# Patient Record
Sex: FEMALE | Race: BLACK OR AFRICAN AMERICAN | NOT HISPANIC OR LATINO | Employment: UNEMPLOYED | URBAN - METROPOLITAN AREA
[De-identification: names, ages, dates, MRNs, and addresses within clinical notes are randomized per-mention and may not be internally consistent; named-entity substitution may affect disease eponyms.]

---

## 2023-12-19 ENCOUNTER — HOSPITAL ENCOUNTER (EMERGENCY)
Facility: HOSPITAL | Age: 53
Discharge: HOME | End: 2023-12-19
Attending: EMERGENCY MEDICINE
Payer: MEDICARE

## 2023-12-19 VITALS
RESPIRATION RATE: 19 BRPM | OXYGEN SATURATION: 98 % | SYSTOLIC BLOOD PRESSURE: 159 MMHG | HEART RATE: 90 BPM | HEIGHT: 66 IN | TEMPERATURE: 98.8 F | DIASTOLIC BLOOD PRESSURE: 99 MMHG | BODY MASS INDEX: 35.43 KG/M2 | WEIGHT: 220.46 LBS

## 2023-12-19 DIAGNOSIS — N39.0 LOWER URINARY TRACT INFECTION: ICD-10-CM

## 2023-12-19 DIAGNOSIS — F25.0 SCHIZOAFFECTIVE DISORDER, BIPOLAR TYPE (MULTI): Primary | ICD-10-CM

## 2023-12-19 LAB
ALBUMIN SERPL BCP-MCNC: 4.2 G/DL (ref 3.4–5)
ALP SERPL-CCNC: 71 U/L (ref 33–110)
ALT SERPL W P-5'-P-CCNC: 17 U/L (ref 7–45)
AMPHETAMINES UR QL SCN: NORMAL
ANION GAP SERPL CALC-SCNC: 15 MMOL/L (ref 10–20)
APAP SERPL-MCNC: <10 UG/ML
APPEARANCE UR: CLEAR
AST SERPL W P-5'-P-CCNC: 18 U/L (ref 9–39)
BARBITURATES UR QL SCN: NORMAL
BASOPHILS # BLD AUTO: 0.03 X10*3/UL (ref 0–0.1)
BASOPHILS NFR BLD AUTO: 0.5 %
BENZODIAZ UR QL SCN: NORMAL
BILIRUB SERPL-MCNC: 0.4 MG/DL (ref 0–1.2)
BILIRUB UR STRIP.AUTO-MCNC: NEGATIVE MG/DL
BUN SERPL-MCNC: 8 MG/DL (ref 6–23)
BZE UR QL SCN: NORMAL
CALCIUM SERPL-MCNC: 9.9 MG/DL (ref 8.6–10.6)
CANNABINOIDS UR QL SCN: NORMAL
CHLORIDE SERPL-SCNC: 105 MMOL/L (ref 98–107)
CO2 SERPL-SCNC: 24 MMOL/L (ref 21–32)
COLOR UR: YELLOW
CREAT SERPL-MCNC: 0.88 MG/DL (ref 0.5–1.05)
EOSINOPHIL # BLD AUTO: 0.22 X10*3/UL (ref 0–0.7)
EOSINOPHIL NFR BLD AUTO: 3.4 %
ERYTHROCYTE [DISTWIDTH] IN BLOOD BY AUTOMATED COUNT: 12.1 % (ref 11.5–14.5)
ETHANOL SERPL-MCNC: <10 MG/DL
FENTANYL+NORFENTANYL UR QL SCN: NORMAL
GFR SERPL CREATININE-BSD FRML MDRD: 78 ML/MIN/1.73M*2
GLUCOSE SERPL-MCNC: 172 MG/DL (ref 74–99)
GLUCOSE UR STRIP.AUTO-MCNC: NEGATIVE MG/DL
HCT VFR BLD AUTO: 37.9 % (ref 36–46)
HGB BLD-MCNC: 13.5 G/DL (ref 12–16)
HOLD SPECIMEN: NORMAL
IMM GRANULOCYTES # BLD AUTO: 0.02 X10*3/UL (ref 0–0.7)
IMM GRANULOCYTES NFR BLD AUTO: 0.3 % (ref 0–0.9)
KETONES UR STRIP.AUTO-MCNC: NEGATIVE MG/DL
LEUKOCYTE ESTERASE UR QL STRIP.AUTO: ABNORMAL
LYMPHOCYTES # BLD AUTO: 2.28 X10*3/UL (ref 1.2–4.8)
LYMPHOCYTES NFR BLD AUTO: 35.6 %
MCH RBC QN AUTO: 30.2 PG (ref 26–34)
MCHC RBC AUTO-ENTMCNC: 35.6 G/DL (ref 32–36)
MCV RBC AUTO: 85 FL (ref 80–100)
MONOCYTES # BLD AUTO: 0.37 X10*3/UL (ref 0.1–1)
MONOCYTES NFR BLD AUTO: 5.8 %
MUCOUS THREADS #/AREA URNS AUTO: ABNORMAL /LPF
NEUTROPHILS # BLD AUTO: 3.48 X10*3/UL (ref 1.2–7.7)
NEUTROPHILS NFR BLD AUTO: 54.4 %
NITRITE UR QL STRIP.AUTO: NEGATIVE
NRBC BLD-RTO: 0 /100 WBCS (ref 0–0)
OPIATES UR QL SCN: NORMAL
OXYCODONE+OXYMORPHONE UR QL SCN: NORMAL
PCP UR QL SCN: NORMAL
PH UR STRIP.AUTO: 7 [PH]
PLATELET # BLD AUTO: 306 X10*3/UL (ref 150–450)
POTASSIUM SERPL-SCNC: 3.4 MMOL/L (ref 3.5–5.3)
PREGNANCY TEST URINE, POC: NEGATIVE
PROT SERPL-MCNC: 7.5 G/DL (ref 6.4–8.2)
PROT UR STRIP.AUTO-MCNC: ABNORMAL MG/DL
RBC # BLD AUTO: 4.47 X10*6/UL (ref 4–5.2)
RBC # UR STRIP.AUTO: ABNORMAL /UL
RBC #/AREA URNS AUTO: >20 /HPF
SALICYLATES SERPL-MCNC: <3 MG/DL
SARS-COV-2 RNA RESP QL NAA+PROBE: NOT DETECTED
SODIUM SERPL-SCNC: 141 MMOL/L (ref 136–145)
SP GR UR STRIP.AUTO: 1.01
SQUAMOUS #/AREA URNS AUTO: ABNORMAL /HPF
UROBILINOGEN UR STRIP.AUTO-MCNC: <2 MG/DL
WBC # BLD AUTO: 6.4 X10*3/UL (ref 4.4–11.3)
WBC #/AREA URNS AUTO: ABNORMAL /HPF

## 2023-12-19 PROCEDURE — 80179 DRUG ASSAY SALICYLATE: CPT

## 2023-12-19 PROCEDURE — 87635 SARS-COV-2 COVID-19 AMP PRB: CPT

## 2023-12-19 PROCEDURE — 81001 URINALYSIS AUTO W/SCOPE: CPT

## 2023-12-19 PROCEDURE — 36415 COLL VENOUS BLD VENIPUNCTURE: CPT

## 2023-12-19 PROCEDURE — 99285 EMERGENCY DEPT VISIT HI MDM: CPT | Performed by: EMERGENCY MEDICINE

## 2023-12-19 PROCEDURE — 85025 COMPLETE CBC W/AUTO DIFF WBC: CPT

## 2023-12-19 PROCEDURE — 81025 URINE PREGNANCY TEST: CPT

## 2023-12-19 PROCEDURE — 80307 DRUG TEST PRSMV CHEM ANLYZR: CPT

## 2023-12-19 PROCEDURE — 99284 EMERGENCY DEPT VISIT MOD MDM: CPT

## 2023-12-19 PROCEDURE — 2500000004 HC RX 250 GENERAL PHARMACY W/ HCPCS (ALT 636 FOR OP/ED)

## 2023-12-19 PROCEDURE — 80053 COMPREHEN METABOLIC PANEL: CPT

## 2023-12-19 RX ORDER — SULFAMETHOXAZOLE AND TRIMETHOPRIM 800; 160 MG/1; MG/1
1 TABLET ORAL ONCE
Status: COMPLETED | OUTPATIENT
Start: 2023-12-19 | End: 2023-12-19

## 2023-12-19 RX ORDER — SULFAMETHOXAZOLE AND TRIMETHOPRIM 800; 160 MG/1; MG/1
1 TABLET ORAL 2 TIMES DAILY
Qty: 10 TABLET | Refills: 0 | Status: SHIPPED | OUTPATIENT
Start: 2023-12-19 | End: 2023-12-24

## 2023-12-19 RX ADMIN — SULFAMETHOXAZOLE AND TRIMETHOPRIM 1 TABLET: 800; 160 TABLET ORAL at 13:44

## 2023-12-19 SDOH — HEALTH STABILITY: MENTAL HEALTH: ARE YOU HAVING THOUGHTS OF KILLING YOURSELF RIGHT NOW?: NO

## 2023-12-19 SDOH — HEALTH STABILITY: MENTAL HEALTH: DEPRESSION SYMPTOMS: NO PROBLEMS REPORTED OR OBSERVED.

## 2023-12-19 SDOH — HEALTH STABILITY: MENTAL HEALTH: WISH TO BE DEAD (PAST 1 MONTH): NO

## 2023-12-19 SDOH — HEALTH STABILITY: MENTAL HEALTH: IN THE PAST WEEK, HAVE YOU BEEN HAVING THOUGHTS ABOUT KILLING YOURSELF?: NO

## 2023-12-19 SDOH — HEALTH STABILITY: MENTAL HEALTH: NON-SPECIFIC ACTIVE SUICIDAL THOUGHTS (PAST 1 MONTH): NO

## 2023-12-19 SDOH — HEALTH STABILITY: MENTAL HEALTH: HAVE YOU EVER TRIED TO KILL YOURSELF?: NO

## 2023-12-19 SDOH — HEALTH STABILITY: MENTAL HEALTH: IN THE PAST FEW WEEKS, HAVE YOU WISHED YOU WERE DEAD?: NO

## 2023-12-19 SDOH — HEALTH STABILITY: MENTAL HEALTH: SUICIDAL BEHAVIOR (LIFETIME): NO

## 2023-12-19 SDOH — HEALTH STABILITY: MENTAL HEALTH: ANXIETY SYMPTOMS: GENERALIZED

## 2023-12-19 SDOH — ECONOMIC STABILITY: HOUSING INSECURITY: FEELS SAFE LIVING IN HOME: YES

## 2023-12-19 SDOH — HEALTH STABILITY: MENTAL HEALTH: IN THE PAST FEW WEEKS, HAVE YOU FELT THAT YOU OR YOUR FAMILY WOULD BE BETTER OFF IF YOU WERE DEAD?: NO

## 2023-12-19 ASSESSMENT — PAIN SCALES - GENERAL: PAINLEVEL_OUTOF10: 0 - NO PAIN

## 2023-12-19 ASSESSMENT — LIFESTYLE VARIABLES
HAVE YOU EVER FELT YOU SHOULD CUT DOWN ON YOUR DRINKING: NO
REASON UNABLE TO ASSESS: NO
EVER HAD A DRINK FIRST THING IN THE MORNING TO STEADY YOUR NERVES TO GET RID OF A HANGOVER: NO
PRESCIPTION_ABUSE_PAST_12_MONTHS: NO
HAVE PEOPLE ANNOYED YOU BY CRITICIZING YOUR DRINKING: NO
SUBSTANCE_ABUSE_PAST_12_MONTHS: NO
EVER FELT BAD OR GUILTY ABOUT YOUR DRINKING: NO

## 2023-12-19 ASSESSMENT — PAIN - FUNCTIONAL ASSESSMENT: PAIN_FUNCTIONAL_ASSESSMENT: 0-10
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
## 2023-12-19 NOTE — DISCHARGE INSTRUCTIONS
We would recommend starting a 5-day course of Bactrim for possible urinary tract infection while you are [facility].  Psychiatry otherwise does not recommend an inpatient stay other than continuing your treatment at [facility].

## 2023-12-24 ENCOUNTER — HOSPITAL ENCOUNTER (EMERGENCY)
Facility: HOSPITAL | Age: 53
Discharge: OTHER INSTITUTION | End: 2023-12-25
Attending: EMERGENCY MEDICINE
Payer: MEDICARE

## 2023-12-24 DIAGNOSIS — F25.9 SCHIZOAFFECTIVE DISORDER, UNSPECIFIED TYPE (MULTI): Primary | ICD-10-CM

## 2023-12-24 LAB
ALBUMIN SERPL BCP-MCNC: 3.9 G/DL (ref 3.4–5)
ALP SERPL-CCNC: 65 U/L (ref 33–110)
ALT SERPL W P-5'-P-CCNC: 17 U/L (ref 7–45)
AMPHETAMINES UR QL SCN: NORMAL
ANION GAP SERPL CALC-SCNC: 12 MMOL/L (ref 10–20)
APAP SERPL-MCNC: <10 UG/ML
AST SERPL W P-5'-P-CCNC: 16 U/L (ref 9–39)
BARBITURATES UR QL SCN: NORMAL
BASOPHILS # BLD AUTO: 0.03 X10*3/UL (ref 0–0.1)
BASOPHILS NFR BLD AUTO: 0.5 %
BENZODIAZ UR QL SCN: NORMAL
BILIRUB SERPL-MCNC: 0.3 MG/DL (ref 0–1.2)
BUN SERPL-MCNC: 14 MG/DL (ref 6–23)
BZE UR QL SCN: NORMAL
CALCIUM SERPL-MCNC: 10.1 MG/DL (ref 8.6–10.6)
CANNABINOIDS UR QL SCN: NORMAL
CHLORIDE SERPL-SCNC: 108 MMOL/L (ref 98–107)
CO2 SERPL-SCNC: 23 MMOL/L (ref 21–32)
CREAT SERPL-MCNC: 1.12 MG/DL (ref 0.5–1.05)
EOSINOPHIL # BLD AUTO: 0.33 X10*3/UL (ref 0–0.7)
EOSINOPHIL NFR BLD AUTO: 6 %
ERYTHROCYTE [DISTWIDTH] IN BLOOD BY AUTOMATED COUNT: 12.4 % (ref 11.5–14.5)
ETHANOL SERPL-MCNC: <10 MG/DL
FENTANYL+NORFENTANYL UR QL SCN: NORMAL
GFR SERPL CREATININE-BSD FRML MDRD: 59 ML/MIN/1.73M*2
GLUCOSE SERPL-MCNC: 119 MG/DL (ref 74–99)
HCT VFR BLD AUTO: 37.1 % (ref 36–46)
HGB BLD-MCNC: 12.7 G/DL (ref 12–16)
IMM GRANULOCYTES # BLD AUTO: 0.02 X10*3/UL (ref 0–0.7)
IMM GRANULOCYTES NFR BLD AUTO: 0.4 % (ref 0–0.9)
LYMPHOCYTES # BLD AUTO: 1.89 X10*3/UL (ref 1.2–4.8)
LYMPHOCYTES NFR BLD AUTO: 34.4 %
MCH RBC QN AUTO: 29.4 PG (ref 26–34)
MCHC RBC AUTO-ENTMCNC: 34.2 G/DL (ref 32–36)
MCV RBC AUTO: 86 FL (ref 80–100)
MONOCYTES # BLD AUTO: 0.43 X10*3/UL (ref 0.1–1)
MONOCYTES NFR BLD AUTO: 7.8 %
NEUTROPHILS # BLD AUTO: 2.8 X10*3/UL (ref 1.2–7.7)
NEUTROPHILS NFR BLD AUTO: 50.9 %
NRBC BLD-RTO: 0 /100 WBCS (ref 0–0)
OPIATES UR QL SCN: NORMAL
OXYCODONE+OXYMORPHONE UR QL SCN: NORMAL
PCP UR QL SCN: NORMAL
PLATELET # BLD AUTO: 270 X10*3/UL (ref 150–450)
POTASSIUM SERPL-SCNC: 4.3 MMOL/L (ref 3.5–5.3)
PREGNANCY TEST URINE, POC: NEGATIVE
PROT SERPL-MCNC: 6.7 G/DL (ref 6.4–8.2)
RBC # BLD AUTO: 4.32 X10*6/UL (ref 4–5.2)
SALICYLATES SERPL-MCNC: <3 MG/DL
SODIUM SERPL-SCNC: 139 MMOL/L (ref 136–145)
WBC # BLD AUTO: 5.5 X10*3/UL (ref 4.4–11.3)

## 2023-12-24 PROCEDURE — 80307 DRUG TEST PRSMV CHEM ANLYZR: CPT | Performed by: STUDENT IN AN ORGANIZED HEALTH CARE EDUCATION/TRAINING PROGRAM

## 2023-12-24 PROCEDURE — 2500000004 HC RX 250 GENERAL PHARMACY W/ HCPCS (ALT 636 FOR OP/ED): Mod: SE | Performed by: EMERGENCY MEDICINE

## 2023-12-24 PROCEDURE — 80143 DRUG ASSAY ACETAMINOPHEN: CPT | Performed by: STUDENT IN AN ORGANIZED HEALTH CARE EDUCATION/TRAINING PROGRAM

## 2023-12-24 PROCEDURE — 99285 EMERGENCY DEPT VISIT HI MDM: CPT | Mod: 25 | Performed by: EMERGENCY MEDICINE

## 2023-12-24 PROCEDURE — 85025 COMPLETE CBC W/AUTO DIFF WBC: CPT | Performed by: STUDENT IN AN ORGANIZED HEALTH CARE EDUCATION/TRAINING PROGRAM

## 2023-12-24 PROCEDURE — 81025 URINE PREGNANCY TEST: CPT | Performed by: STUDENT IN AN ORGANIZED HEALTH CARE EDUCATION/TRAINING PROGRAM

## 2023-12-24 PROCEDURE — 80053 COMPREHEN METABOLIC PANEL: CPT | Performed by: STUDENT IN AN ORGANIZED HEALTH CARE EDUCATION/TRAINING PROGRAM

## 2023-12-24 PROCEDURE — 99285 EMERGENCY DEPT VISIT HI MDM: CPT | Performed by: EMERGENCY MEDICINE

## 2023-12-24 PROCEDURE — 36415 COLL VENOUS BLD VENIPUNCTURE: CPT | Performed by: STUDENT IN AN ORGANIZED HEALTH CARE EDUCATION/TRAINING PROGRAM

## 2023-12-24 RX ORDER — OLANZAPINE 10 MG/2ML
INJECTION, POWDER, FOR SOLUTION INTRAMUSCULAR
Status: DISPENSED
Start: 2023-12-24 | End: 2023-12-25

## 2023-12-24 RX ORDER — HALOPERIDOL 5 MG/ML
INJECTION INTRAMUSCULAR
Status: DISPENSED
Start: 2023-12-24 | End: 2023-12-25

## 2023-12-24 RX ORDER — OLANZAPINE 10 MG/1
TABLET, ORALLY DISINTEGRATING ORAL
Status: COMPLETED
Start: 2023-12-24 | End: 2023-12-24

## 2023-12-24 RX ADMIN — OLANZAPINE: 10 TABLET, ORALLY DISINTEGRATING ORAL at 21:55

## 2023-12-24 SDOH — HEALTH STABILITY: MENTAL HEALTH

## 2023-12-24 SDOH — HEALTH STABILITY: MENTAL HEALTH: BEHAVIORS/MOOD: AGITATED;RESTLESS;HYPER-VERBAL;INAPPROPRIATE;IRRITABLE

## 2023-12-24 SDOH — HEALTH STABILITY: MENTAL HEALTH: NEEDS EXPRESSED: DENIES

## 2023-12-24 SDOH — HEALTH STABILITY: MENTAL HEALTH: HAVE YOU EVER DONE ANYTHING, STARTED TO DO ANYTHING, OR PREPARED TO DO ANYTHING TO END YOUR LIFE?: NO

## 2023-12-24 SDOH — HEALTH STABILITY: MENTAL HEALTH: HAVE YOU WISHED YOU WERE DEAD OR WISHED YOU COULD GO TO SLEEP AND NOT WAKE UP?: NO

## 2023-12-24 SDOH — HEALTH STABILITY: MENTAL HEALTH: CONTENT: BLAMING OTHERS

## 2023-12-24 SDOH — HEALTH STABILITY: MENTAL HEALTH: DELUSIONS: PERSECUTORY

## 2023-12-24 SDOH — HEALTH STABILITY: MENTAL HEALTH: SUICIDE ASSESSMENT: ADULT (C-SSRS)

## 2023-12-24 SDOH — HEALTH STABILITY: MENTAL HEALTH: HAVE YOU ACTUALLY HAD ANY THOUGHTS OF KILLING YOURSELF?: NO

## 2023-12-24 ASSESSMENT — COLUMBIA-SUICIDE SEVERITY RATING SCALE - C-SSRS
2. HAVE YOU ACTUALLY HAD ANY THOUGHTS OF KILLING YOURSELF?: NO
6. HAVE YOU EVER DONE ANYTHING, STARTED TO DO ANYTHING, OR PREPARED TO DO ANYTHING TO END YOUR LIFE?: NO
1. IN THE PAST MONTH, HAVE YOU WISHED YOU WERE DEAD OR WISHED YOU COULD GO TO SLEEP AND NOT WAKE UP?: NO

## 2023-12-25 VITALS
SYSTOLIC BLOOD PRESSURE: 153 MMHG | RESPIRATION RATE: 16 BRPM | WEIGHT: 220 LBS | BODY MASS INDEX: 38.98 KG/M2 | OXYGEN SATURATION: 100 % | HEIGHT: 63 IN | TEMPERATURE: 96.8 F | HEART RATE: 98 BPM | DIASTOLIC BLOOD PRESSURE: 89 MMHG

## 2023-12-25 LAB
ATRIAL RATE: 65 BPM
P AXIS: 47 DEGREES
P OFFSET: 210 MS
P ONSET: 163 MS
PR INTERVAL: 124 MS
Q ONSET: 225 MS
QRS COUNT: 11 BEATS
QRS DURATION: 80 MS
QT INTERVAL: 386 MS
QTC CALCULATION(BAZETT): 401 MS
QTC FREDERICIA: 396 MS
R AXIS: 14 DEGREES
SARS-COV-2 RNA RESP QL NAA+PROBE: NOT DETECTED
T AXIS: 4 DEGREES
T OFFSET: 418 MS
VENTRICULAR RATE: 65 BPM

## 2023-12-25 PROCEDURE — 96372 THER/PROPH/DIAG INJ SC/IM: CPT

## 2023-12-25 PROCEDURE — 2500000004 HC RX 250 GENERAL PHARMACY W/ HCPCS (ALT 636 FOR OP/ED): Mod: SE | Performed by: STUDENT IN AN ORGANIZED HEALTH CARE EDUCATION/TRAINING PROGRAM

## 2023-12-25 PROCEDURE — 2500000004 HC RX 250 GENERAL PHARMACY W/ HCPCS (ALT 636 FOR OP/ED): Mod: SE

## 2023-12-25 PROCEDURE — 87635 SARS-COV-2 COVID-19 AMP PRB: CPT | Performed by: STUDENT IN AN ORGANIZED HEALTH CARE EDUCATION/TRAINING PROGRAM

## 2023-12-25 PROCEDURE — 2500000004 HC RX 250 GENERAL PHARMACY W/ HCPCS (ALT 636 FOR OP/ED): Mod: SE | Performed by: EMERGENCY MEDICINE

## 2023-12-25 RX ORDER — LORAZEPAM 2 MG/ML
INJECTION INTRAMUSCULAR
Status: COMPLETED
Start: 2023-12-25 | End: 2023-12-25

## 2023-12-25 RX ORDER — DROPERIDOL 2.5 MG/ML
5 INJECTION, SOLUTION INTRAMUSCULAR; INTRAVENOUS ONCE
Status: COMPLETED | OUTPATIENT
Start: 2023-12-25 | End: 2023-12-25

## 2023-12-25 RX ORDER — DROPERIDOL 2.5 MG/ML
5 INJECTION, SOLUTION INTRAMUSCULAR; INTRAVENOUS ONCE
Status: DISCONTINUED | OUTPATIENT
Start: 2023-12-25 | End: 2023-12-25

## 2023-12-25 RX ORDER — LORAZEPAM 2 MG/ML
2 INJECTION INTRAMUSCULAR ONCE
Status: COMPLETED | OUTPATIENT
Start: 2023-12-25 | End: 2023-12-25

## 2023-12-25 RX ORDER — OLANZAPINE 10 MG/1
10 TABLET, ORALLY DISINTEGRATING ORAL ONCE
Status: COMPLETED | OUTPATIENT
Start: 2023-12-25 | End: 2023-12-25

## 2023-12-25 RX ADMIN — LORAZEPAM 2 MG: 2 INJECTION INTRAMUSCULAR at 12:45

## 2023-12-25 RX ADMIN — LORAZEPAM 2 MG: 2 INJECTION, SOLUTION INTRAMUSCULAR; INTRAVENOUS at 12:45

## 2023-12-25 RX ADMIN — OLANZAPINE 10 MG: 10 TABLET, ORALLY DISINTEGRATING ORAL at 10:59

## 2023-12-25 RX ADMIN — DROPERIDOL 5 MG: 2.5 INJECTION, SOLUTION INTRAMUSCULAR; INTRAVENOUS at 02:53

## 2023-12-25 SDOH — HEALTH STABILITY: MENTAL HEALTH: SUICIDAL BEHAVIOR (3 MONTHS): NO

## 2023-12-25 SDOH — HEALTH STABILITY: MENTAL HEALTH: IN THE PAST FEW WEEKS, HAVE YOU WISHED YOU WERE DEAD?: NO

## 2023-12-25 SDOH — HEALTH STABILITY: MENTAL HEALTH: IN THE PAST WEEK, HAVE YOU BEEN HAVING THOUGHTS ABOUT KILLING YOURSELF?: NO

## 2023-12-25 SDOH — HEALTH STABILITY: MENTAL HEALTH: ANXIETY SYMPTOMS: NO PROBLEMS REPORTED OR OBSERVED.

## 2023-12-25 SDOH — HEALTH STABILITY: MENTAL HEALTH: SUICIDAL BEHAVIOR (LIFETIME): YES

## 2023-12-25 SDOH — HEALTH STABILITY: MENTAL HEALTH: NON-SPECIFIC ACTIVE SUICIDAL THOUGHTS (PAST 1 MONTH): NO

## 2023-12-25 SDOH — ECONOMIC STABILITY: HOUSING INSECURITY: FEELS SAFE LIVING IN HOME: NO

## 2023-12-25 SDOH — HEALTH STABILITY: MENTAL HEALTH: HAVE YOU EVER TRIED TO KILL YOURSELF?: YES

## 2023-12-25 SDOH — HEALTH STABILITY: MENTAL HEALTH: IN THE PAST FEW WEEKS, HAVE YOU FELT THAT YOU OR YOUR FAMILY WOULD BE BETTER OFF IF YOU WERE DEAD?: NO

## 2023-12-25 SDOH — HEALTH STABILITY: MENTAL HEALTH: ARE YOU HAVING THOUGHTS OF KILLING YOURSELF RIGHT NOW?: NO

## 2023-12-25 SDOH — HEALTH STABILITY: MENTAL HEALTH: WISH TO BE DEAD (PAST 1 MONTH): NO

## 2023-12-25 SDOH — HEALTH STABILITY: MENTAL HEALTH: DEPRESSION SYMPTOMS: NO PROBLEMS REPORTED OR OBSERVED.

## 2023-12-25 ASSESSMENT — LIFESTYLE VARIABLES
HAVE YOU EVER FELT YOU SHOULD CUT DOWN ON YOUR DRINKING: NO
SUBSTANCE_ABUSE_PAST_12_MONTHS: NO
REASON UNABLE TO ASSESS: NO
EVER FELT BAD OR GUILTY ABOUT YOUR DRINKING: NO
PRESCIPTION_ABUSE_PAST_12_MONTHS: NO
HAVE PEOPLE ANNOYED YOU BY CRITICIZING YOUR DRINKING: NO
EVER HAD A DRINK FIRST THING IN THE MORNING TO STEADY YOUR NERVES TO GET RID OF A HANGOVER: NO

## 2023-12-25 ASSESSMENT — PAIN SCALES - GENERAL: PAINLEVEL_OUTOF10: 0 - NO PAIN

## 2023-12-25 ASSESSMENT — PAIN - FUNCTIONAL ASSESSMENT: PAIN_FUNCTIONAL_ASSESSMENT: 0-10

## 2023-12-25 NOTE — PROGRESS NOTES
Patient was signed out to me by Dr. Schneider at approximately 0700. For full history, physical, and prior ED course, please see previous provider note prior to patient handoff. This is an addendum to the record.     HOSPITAL COURSE/MEDICAL DECISION MAKING:  Guadalupe Lloyd is a 53 y.o. female presenting to the ED due to acute decompensated paranoia.  At time of signout was pending placement per EPAT.  Placement was secured for [facility].  Patient did become acutely decompensated again and required a dose of Zyprexa.  She later required a second dose of Ativan as well.  Following this, patient was safely transferred to [facility].    Final diagnoses:   [F25.9] Schizoaffective disorder, unspecified type (CMS/[facility])       DISPOSITION:   Transfer    Georgina Sarkar MD   Emergency Medicine Resident, PGY3  [facility]    Procedure  Procedures

## 2023-12-25 NOTE — SIGNIFICANT EVENT
Application for Emergency Admission      Ready for Transfer?  Is the patient medically cleared for transfer to inpatient psychiatry: Yes  Has the patient been accepted to an inpatient psychiatric hospital: Yes    Application for Emergency Admission  IN ACCORDANCE WITH [place]  The Chief Clinical Officer of: Beckie 12/25/2023 .10:39 AM    Reason for Hospitalization  The undersigned has reason to believe that: Guadalupe Lloyd Is a mentally ill person subject to hospitalization by court order under division B Section 5122.01 of the Revised Code, i.e., this person:    1.Yes  Represents a substantial risk of physical harm to self as manifested by evidence of threats of, or attempts at, suicide or serious self-inflicted bodily harm    2.Yes Represents a substantial risk of physical harm to others as manifested by evidence of recent homicidal or other violent behavior, evidence of recent threats that place another in reasonable fear of violent behavior and serious physical harm, or other evidence of present dangerousness    3.Yes Represents a substantial and immediate risk of serious physical impairment or injury to self as manifested by  evidence that the person is unable to provide for and is not providing for the person's basic physical needs because of the person's mental illness and that appropriate provision for those needs cannot be made  immediately available in the community    4.Yes Would benefit from treatment in a hospital for his mental illness and is in need of such treatment as manifested by evidence of behavior that creates a grave and imminent risk to substantial rights of others or  himself.    5.Yes Would benefit from treatment as manifested by evidence of behavior that indicates all of the following:       (a) The person is unlikely to survive safely in the community without supervision, based on a clinical determination.       (b) The person has a history of lack of  compliance with treatment for mental illness and one of the following applies:      (i) At least twice within the thirty-six months prior to the filing of an affidavit seeking court-ordered treatment of the person under section 5122.111 of the Revised Code, the lack of compliance has been a significant factor in necessitating hospitalization in a hospital or receipt of services in a forensic or other mental health unit of a correctional facility, provided that the thirty-six-month period shall be extended by the length of any hospitalization or incarceration of the person that occurred within the thirty-six-month period.      (ii) Within the forty-eight months prior to the filing of an affidavit seeking court-ordered treatment of the person under section 5122.111 of the Revised Code, the lack of compliance resulted in one or more acts of serious violent behavior toward self or others or threats of, or attempts at, serious physical harm to self or others, provided that the forty-eight-month period shall be extended by the length of any hospitalization or incarceration of the person that occurred within the forty-eight-month period.      (c) The person, as a result of mental illness, is unlikely to voluntarily participate in necessary treatment.       (d) In view of the person's treatment history and current behavior, the person is in need of treatment in order to prevent a relapse or deterioration that would be likely to result in substantial risk of serious harm to the person or others.    (e) Represents a substantial risk of physical harm to self or others if allowed to remain at liberty pending examination.    Therefore, it is requested that said person be admitted to the above named facility.    STATEMENT OF BELIEF    Must be filled out by one of the following: a psychiatrist, licensed physician, licensed clinical psychologist, health or ,  or .  (Statement shall include the  circumstances under which the individual was taken into custody and the reason for the person's belief that hospitalization is necessary. The statement shall also include a reference to efforts made to secure the individual's property at his residence if he was taken into custody there. Every reasonable and appropriate effort should be made to take this person into custody in the least conspicuous manner possible.)    Has acute decompensated psychosis that would benefit from inpatient management.      Georgina Sarkar MD 12/25/2023     _____________________________________________________________   Place of Employment: [facility]    STATEMENT OF OBSERVATION BY PSYCHIATRIST, LICENSED PHYSICIAN, OR LICENSED CLINICAL PSYCHOLOGIST, IF APPLICABLE    Place of Observation (e.g., [facility] center, general hospital, office, emergency facility)  (If applicable, please complete)    Georgina Sarkar MD 12/25/2023    _____________________________________________________________
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
## 2023-12-26 ENCOUNTER — ANCILLARY PROCEDURE (OUTPATIENT)
Dept: EMERGENCY MEDICINE | Facility: HOSPITAL | Age: 53
End: 2023-12-26
Payer: MEDICARE

## 2023-12-26 LAB
ATRIAL RATE: 63 BPM
P AXIS: 57 DEGREES
P OFFSET: 205 MS
P ONSET: 161 MS
PR INTERVAL: 128 MS
Q ONSET: 225 MS
QRS COUNT: 10 BEATS
QRS DURATION: 88 MS
QT INTERVAL: 376 MS
QTC CALCULATION(BAZETT): 384 MS
QTC FREDERICIA: 382 MS
R AXIS: 1 DEGREES
T AXIS: 65 DEGREES
T OFFSET: 413 MS
VENTRICULAR RATE: 63 BPM

## 2023-12-26 PROCEDURE — 93005 ELECTROCARDIOGRAM TRACING: CPT | Mod: 59

## 2024-01-10 ENCOUNTER — APPOINTMENT (OUTPATIENT)
Dept: CARDIOLOGY | Facility: HOSPITAL | Age: 54
End: 2024-01-10
Payer: COMMERCIAL

## 2024-01-10 ENCOUNTER — HOSPITAL ENCOUNTER (EMERGENCY)
Facility: HOSPITAL | Age: 54
Discharge: HOME | End: 2024-01-11
Attending: STUDENT IN AN ORGANIZED HEALTH CARE EDUCATION/TRAINING PROGRAM
Payer: COMMERCIAL

## 2024-01-10 DIAGNOSIS — N39.0 URINARY TRACT INFECTION WITHOUT HEMATURIA, SITE UNSPECIFIED: ICD-10-CM

## 2024-01-10 DIAGNOSIS — F25.9 SCHIZOAFFECTIVE DISORDER, UNSPECIFIED TYPE (MULTI): Primary | ICD-10-CM

## 2024-01-10 LAB
ALBUMIN SERPL-MCNC: 3.8 G/DL (ref 3.5–5)
ALP BLD-CCNC: 80 U/L (ref 35–125)
ALT SERPL-CCNC: 58 U/L (ref 5–40)
AMPHETAMINES UR QL SCN>1000 NG/ML: NEGATIVE
ANION GAP SERPL CALC-SCNC: 10 MMOL/L
APPEARANCE UR: CLEAR
AST SERPL-CCNC: 187 U/L (ref 5–40)
BARBITURATES UR QL SCN>300 NG/ML: NEGATIVE
BASOPHILS # BLD AUTO: 0.02 X10*3/UL (ref 0–0.1)
BASOPHILS NFR BLD AUTO: 0.2 %
BENZODIAZ UR QL SCN>300 NG/ML: NEGATIVE
BILIRUB SERPL-MCNC: 0.4 MG/DL (ref 0.1–1.2)
BILIRUB UR STRIP.AUTO-MCNC: NEGATIVE MG/DL
BUN SERPL-MCNC: 14 MG/DL (ref 8–25)
BZE UR QL SCN>300 NG/ML: NEGATIVE
CALCIUM SERPL-MCNC: 9.7 MG/DL (ref 8.5–10.4)
CANNABINOIDS UR QL SCN>50 NG/ML: NEGATIVE
CHLORIDE SERPL-SCNC: 103 MMOL/L (ref 97–107)
CO2 SERPL-SCNC: 25 MMOL/L (ref 24–31)
COLOR UR: YELLOW
CREAT SERPL-MCNC: 0.9 MG/DL (ref 0.4–1.6)
EGFRCR SERPLBLD CKD-EPI 2021: 76 ML/MIN/1.73M*2
EOSINOPHIL # BLD AUTO: 0.03 X10*3/UL (ref 0–0.7)
EOSINOPHIL NFR BLD AUTO: 0.3 %
ERYTHROCYTE [DISTWIDTH] IN BLOOD BY AUTOMATED COUNT: 13.2 % (ref 11.5–14.5)
ETHANOL SERPL-MCNC: <0.01 G/DL
FENTANYL+NORFENTANYL UR QL SCN: NEGATIVE
FLUAV RNA RESP QL NAA+PROBE: NOT DETECTED
FLUBV RNA RESP QL NAA+PROBE: NOT DETECTED
GLUCOSE SERPL-MCNC: 106 MG/DL (ref 65–99)
GLUCOSE UR STRIP.AUTO-MCNC: NORMAL MG/DL
HCG UR QL IA.RAPID: NEGATIVE
HCT VFR BLD AUTO: 35.7 % (ref 36–46)
HGB BLD-MCNC: 11.8 G/DL (ref 12–16)
IMM GRANULOCYTES # BLD AUTO: 0.03 X10*3/UL (ref 0–0.7)
IMM GRANULOCYTES NFR BLD AUTO: 0.3 % (ref 0–0.9)
KETONES UR STRIP.AUTO-MCNC: ABNORMAL MG/DL
LEUKOCYTE ESTERASE UR QL STRIP.AUTO: ABNORMAL
LYMPHOCYTES # BLD AUTO: 1.45 X10*3/UL (ref 1.2–4.8)
LYMPHOCYTES NFR BLD AUTO: 15.5 %
MCH RBC QN AUTO: 29.8 PG (ref 26–34)
MCHC RBC AUTO-ENTMCNC: 33.1 G/DL (ref 32–36)
MCV RBC AUTO: 90 FL (ref 80–100)
METHADONE UR QL SCN>300 NG/ML: NEGATIVE
MONOCYTES # BLD AUTO: 0.64 X10*3/UL (ref 0.1–1)
MONOCYTES NFR BLD AUTO: 6.8 %
MUCOUS THREADS #/AREA URNS AUTO: ABNORMAL /LPF
NEUTROPHILS # BLD AUTO: 7.2 X10*3/UL (ref 1.2–7.7)
NEUTROPHILS NFR BLD AUTO: 76.9 %
NITRITE UR QL STRIP.AUTO: NEGATIVE
NRBC BLD-RTO: 0 /100 WBCS (ref 0–0)
OPIATES UR QL SCN>300 NG/ML: NEGATIVE
OXYCODONE UR QL: NEGATIVE
PCP UR QL SCN>25 NG/ML: NEGATIVE
PH UR STRIP.AUTO: 5.5 [PH]
PLATELET # BLD AUTO: 309 X10*3/UL (ref 150–450)
POTASSIUM SERPL-SCNC: 4 MMOL/L (ref 3.4–5.1)
PROT SERPL-MCNC: 6.7 G/DL (ref 5.9–7.9)
PROT UR STRIP.AUTO-MCNC: ABNORMAL MG/DL
RBC # BLD AUTO: 3.96 X10*6/UL (ref 4–5.2)
RBC # UR STRIP.AUTO: ABNORMAL /UL
RBC #/AREA URNS AUTO: >20 /HPF
SARS-COV-2 RNA RESP QL NAA+PROBE: NOT DETECTED
SODIUM SERPL-SCNC: 138 MMOL/L (ref 133–145)
SP GR UR STRIP.AUTO: 1.03
UROBILINOGEN UR STRIP.AUTO-MCNC: NORMAL MG/DL
WBC # BLD AUTO: 9.4 X10*3/UL (ref 4.4–11.3)
WBC #/AREA URNS AUTO: ABNORMAL /HPF

## 2024-01-10 PROCEDURE — 90839 PSYTX CRISIS INITIAL 60 MIN: CPT

## 2024-01-10 PROCEDURE — 81025 URINE PREGNANCY TEST: CPT | Performed by: STUDENT IN AN ORGANIZED HEALTH CARE EDUCATION/TRAINING PROGRAM

## 2024-01-10 PROCEDURE — 87086 URINE CULTURE/COLONY COUNT: CPT | Mod: WESLAB | Performed by: STUDENT IN AN ORGANIZED HEALTH CARE EDUCATION/TRAINING PROGRAM

## 2024-01-10 PROCEDURE — 81001 URINALYSIS AUTO W/SCOPE: CPT | Mod: 59 | Performed by: STUDENT IN AN ORGANIZED HEALTH CARE EDUCATION/TRAINING PROGRAM

## 2024-01-10 PROCEDURE — 36415 COLL VENOUS BLD VENIPUNCTURE: CPT | Performed by: STUDENT IN AN ORGANIZED HEALTH CARE EDUCATION/TRAINING PROGRAM

## 2024-01-10 PROCEDURE — 80053 COMPREHEN METABOLIC PANEL: CPT | Performed by: STUDENT IN AN ORGANIZED HEALTH CARE EDUCATION/TRAINING PROGRAM

## 2024-01-10 PROCEDURE — 93005 ELECTROCARDIOGRAM TRACING: CPT

## 2024-01-10 PROCEDURE — 87636 SARSCOV2 & INF A&B AMP PRB: CPT | Performed by: STUDENT IN AN ORGANIZED HEALTH CARE EDUCATION/TRAINING PROGRAM

## 2024-01-10 PROCEDURE — 2500000001 HC RX 250 WO HCPCS SELF ADMINISTERED DRUGS (ALT 637 FOR MEDICARE OP): Performed by: STUDENT IN AN ORGANIZED HEALTH CARE EDUCATION/TRAINING PROGRAM

## 2024-01-10 PROCEDURE — 99285 EMERGENCY DEPT VISIT HI MDM: CPT | Performed by: STUDENT IN AN ORGANIZED HEALTH CARE EDUCATION/TRAINING PROGRAM

## 2024-01-10 PROCEDURE — 82077 ASSAY SPEC XCP UR&BREATH IA: CPT | Performed by: STUDENT IN AN ORGANIZED HEALTH CARE EDUCATION/TRAINING PROGRAM

## 2024-01-10 PROCEDURE — 80307 DRUG TEST PRSMV CHEM ANLYZR: CPT | Performed by: STUDENT IN AN ORGANIZED HEALTH CARE EDUCATION/TRAINING PROGRAM

## 2024-01-10 PROCEDURE — 85025 COMPLETE CBC W/AUTO DIFF WBC: CPT | Performed by: STUDENT IN AN ORGANIZED HEALTH CARE EDUCATION/TRAINING PROGRAM

## 2024-01-10 RX ORDER — ACETAMINOPHEN 325 MG/1
650 TABLET ORAL ONCE
Status: COMPLETED | OUTPATIENT
Start: 2024-01-10 | End: 2024-01-10

## 2024-01-10 RX ORDER — SULFAMETHOXAZOLE AND TRIMETHOPRIM 800; 160 MG/1; MG/1
1 TABLET ORAL ONCE
Status: DISCONTINUED | OUTPATIENT
Start: 2024-01-10 | End: 2024-01-11 | Stop reason: HOSPADM

## 2024-01-10 RX ORDER — IBUPROFEN 600 MG/1
600 TABLET ORAL ONCE
Status: COMPLETED | OUTPATIENT
Start: 2024-01-10 | End: 2024-01-10

## 2024-01-10 RX ADMIN — ACETAMINOPHEN 650 MG: 325 TABLET ORAL at 22:10

## 2024-01-10 RX ADMIN — IBUPROFEN 600 MG: 600 TABLET, FILM COATED ORAL at 21:10

## 2024-01-10 SDOH — HEALTH STABILITY: MENTAL HEALTH: SUICIDAL BEHAVIOR (LIFETIME): NO

## 2024-01-10 SDOH — HEALTH STABILITY: MENTAL HEALTH: IN THE PAST WEEK, HAVE YOU BEEN HAVING THOUGHTS ABOUT KILLING YOURSELF?: NO

## 2024-01-10 SDOH — HEALTH STABILITY: MENTAL HEALTH: IN THE PAST FEW WEEKS, HAVE YOU FELT THAT YOU OR YOUR FAMILY WOULD BE BETTER OFF IF YOU WERE DEAD?: NO

## 2024-01-10 SDOH — HEALTH STABILITY: MENTAL HEALTH

## 2024-01-10 SDOH — HEALTH STABILITY: MENTAL HEALTH: IN THE PAST FEW WEEKS, HAVE YOU WISHED YOU WERE DEAD?: NO

## 2024-01-10 SDOH — HEALTH STABILITY: MENTAL HEALTH: HAVE YOU EVER TRIED TO KILL YOURSELF?: NO

## 2024-01-10 SDOH — HEALTH STABILITY: MENTAL HEALTH: SUICIDAL BEHAVIOR (3 MONTHS): NO

## 2024-01-10 SDOH — HEALTH STABILITY: MENTAL HEALTH: WISH TO BE DEAD (PAST 1 MONTH): NO

## 2024-01-10 SDOH — HEALTH STABILITY: MENTAL HEALTH: ANXIETY SYMPTOMS: NO PROBLEMS REPORTED OR OBSERVED.

## 2024-01-10 SDOH — ECONOMIC STABILITY: HOUSING INSECURITY: FEELS SAFE LIVING IN HOME: OTHER (COMMENT)

## 2024-01-10 SDOH — HEALTH STABILITY: MENTAL HEALTH: NON-SPECIFIC ACTIVE SUICIDAL THOUGHTS (PAST 1 MONTH): NO

## 2024-01-10 SDOH — HEALTH STABILITY: MENTAL HEALTH: SUICIDE ASSESSMENT: ADULT (C-SSRS)

## 2024-01-10 SDOH — HEALTH STABILITY: MENTAL HEALTH: ARE YOU HAVING THOUGHTS OF KILLING YOURSELF RIGHT NOW?: NO

## 2024-01-10 SDOH — HEALTH STABILITY: MENTAL HEALTH: DEPRESSION SYMPTOMS: NO PROBLEMS REPORTED OR OBSERVED.

## 2024-01-10 ASSESSMENT — LIFESTYLE VARIABLES
HAVE PEOPLE ANNOYED YOU BY CRITICIZING YOUR DRINKING: NO
SUBSTANCE_ABUSE_PAST_12_MONTHS: NO
REASON UNABLE TO ASSESS: NO
EVER FELT BAD OR GUILTY ABOUT YOUR DRINKING: NO
HAVE YOU EVER FELT YOU SHOULD CUT DOWN ON YOUR DRINKING: NO
PRESCIPTION_ABUSE_PAST_12_MONTHS: NO
EVER HAD A DRINK FIRST THING IN THE MORNING TO STEADY YOUR NERVES TO GET RID OF A HANGOVER: NO

## 2024-01-10 ASSESSMENT — PAIN SCALES - GENERAL
PAINLEVEL_OUTOF10: 0 - NO PAIN
PAINLEVEL_OUTOF10: 0 - NO PAIN

## 2024-01-10 ASSESSMENT — COLUMBIA-SUICIDE SEVERITY RATING SCALE - C-SSRS
1. IN THE PAST MONTH, HAVE YOU WISHED YOU WERE DEAD OR WISHED YOU COULD GO TO SLEEP AND NOT WAKE UP?: NO
6. HAVE YOU EVER DONE ANYTHING, STARTED TO DO ANYTHING, OR PREPARED TO DO ANYTHING TO END YOUR LIFE?: NO
2. HAVE YOU ACTUALLY HAD ANY THOUGHTS OF KILLING YOURSELF?: NO
2. HAVE YOU ACTUALLY HAD ANY THOUGHTS OF KILLING YOURSELF?: NO
6. HAVE YOU EVER DONE ANYTHING, STARTED TO DO ANYTHING, OR PREPARED TO DO ANYTHING TO END YOUR LIFE?: NO
1. SINCE LAST CONTACT, HAVE YOU WISHED YOU WERE DEAD OR WISHED YOU COULD GO TO SLEEP AND NOT WAKE UP?: NO

## 2024-01-10 ASSESSMENT — PAIN DESCRIPTION - PROGRESSION: CLINICAL_PROGRESSION: NOT CHANGED

## 2024-01-10 ASSESSMENT — PAIN - FUNCTIONAL ASSESSMENT: PAIN_FUNCTIONAL_ASSESSMENT: 0-10
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
## 2024-01-10 NOTE — ED PROVIDER NOTES
HPI   Chief Complaint   Patient presents with    Psychiatric Evaluation       Patient with history of schizoaffective disorder presents with delusions.  She states that she believes that her medications are poisoning her as the medications include mercury.  She states after she takes her medications, she regurgitates soap into her mouth.  She denies any thoughts of harming herself or harming others at this time.                          No data recorded                Patient History   History reviewed. No pertinent past medical history.  History reviewed. No pertinent surgical history.  No family history on file.  Social History     Tobacco Use    Smoking status: Never    Smokeless tobacco: Never   Substance Use Topics    Alcohol use: Not on file    Drug use: Not on file       Physical Exam   ED Triage Vitals [01/10/24 1331]   Temp Heart Rate Resp BP   36.8 °C (98.2 °F) 93 18 133/79      SpO2 Temp Source Heart Rate Source Patient Position   100 % Oral Monitor Sitting      BP Location FiO2 (%)     Left arm --       Physical Exam  HENT:      Head: Normocephalic.   Cardiovascular:      Rate and Rhythm: Normal rate.   Pulmonary:      Effort: Pulmonary effort is normal.   Neurological:      General: No focal deficit present.      Mental Status: She is alert.   Psychiatric:      Comments: Patient cooperative with normal affect at this time.  Delusions.         ED Course & MDM   ED Course as of 01/10/24 2307   Wed José Antonio 10, 2024   2222 ECG performed on January 10, 2024 at 1356 and interpreted by me at 10:22 PM shows NSR, NAD, intervals within normal limits, no STEMI.  Similar to previous from December 25, 2023. [EG]      ED Course User Index  [EG] Salina Baker MD         Diagnoses as of 01/10/24 2307   Schizoaffective disorder, unspecified type (CMS/HCC)       Medical Decision Making  Patient is medically cleared and awaiting placement at this time.  Laboratory studies unremarkable except for mild elevation of  liver enzyme tests.  Urinalysis mildly suggestive of urinary tract infection, patient treated with antibiotics for this.  Patient signed out to Dr. Caldwell pending placement.  Parts of this chart were completed with dictation software, please excuse any errors in transcription.        Procedure  Procedures     Eliezer Turner MD  01/10/24 9650
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
## 2024-01-10 NOTE — PROGRESS NOTES
"EPAT - Social Work Psychiatric Assessment    Arrival Details  Mode of Arrival: Ambulatory (Nelsy BECKHAM)  Admission Source: Other (Comment) (Homeless)  Admission Type: Involuntary  EPAT Assessment Start Date: 01/10/24  EPAT Assessment Start Time: 1538  Name of : Annie RITTER    History of Present Illness  Admission Reason: PD noted Pt to have paranoid delusions about people who were out to get her and could \"terminate\" her.  HPI: SW reviewed records and spoke with Pt and Pt's guardian to obtain hx. Pt was reportedly found by Nelsy BECKHAM, who noted Pt was exhibiting paranoid delusions, and brought Pt to ED on a pink slip for evaluation. Per records, Pt with previous diagnoses of schizoaffective disorder vs. paranoid schizophrenia, borderline personality disorder. Pt with hx 2 previous remote suicide attempts. Pt with hx numerous psychiatric admissions, most recently admitted to [facility] 12/24/23. Pt resides at [facility] in [place] in a locked psychiatric unit.     Readmission Information   Readmission within 30 Days: Yes  Previous ED Visit Date and Reason : 12/24/23 [facility], decompensated schizophrenia  Previous Discharge Date and Location: [facility], Pt states discharged yesterda  Factors Contributing to  Readmission Inpatient/ED (Team Perspective): Homeless, Lack of Community Support, Lack of Family/Friend Support, Cognitively Limited, Med Compliance/Difficulty Obtaining  Factors Contributing to Readmission (Patient/Family Perspective): Patient appears to have poor insight into why she was brought to the ED  Readmission From: Other (Comment) (Homeless)    Psychiatric Symptoms  Anxiety Symptoms: No problems reported or observed.  Depression Symptoms: No problems reported or observed.  Joy Symptoms: No problems reported or observed.    Psychosis Symptoms  Hallucination Type: No problems reported or observed. (Pt denies AVH, and none were noted on SW interview, " "however staff note Pt has appeared to talk to unseen others during ED visit)  Delusion Type: Broadcasting, Persecutory, Paranoid (Patient reporting multiple different delusions to staff)    Additional Symptoms - Adult  Generalized Anxiety Disorder: No problems reported or observed.  Obsessive Compulsive Disorder: No problems reported or observed.  Panic Attack: No problems reported or observed.  Post Traumatic Stress Disorder: No problems reported or observed.  Delirium: No problems reported or observed.    Past Psychiatric History/Meds/Treatments  Past Psychiatric History: Per records, schizoaffective disorder vs. paranoid schizophrenia, borderline personality disorder, MDD. Pt with hx numerous previous psychiatric admissions to [facility], [facility], [facility], [facility], [facility], [facility], and [facility]. Most recent admit 12/24/23 at [facility]. Per records, Pt with hx 2 suicide attempts by hanging and jumping out of a 3rd story window  Past Psychiatric Meds/Treatments: Per records, hx noncompliance with medications. Currently admits to noncompliance, states they are evil    Current Mental Health Contacts   Name/Phone Number: Pt states none  Provider Name/Phone Number: Pt states none    Support System: Other (Comment) (Pt states none)    Living Arrangement: Homeless (Pt was previously staying at [facility] in [place], but states she was \"discharged to work\" and is now homeless.)    Home Safety  Feels Safe Living in Home: Other (Comment) (Homeless)    Income Information  Employment Status for: Patient  Employment Status: Disabled    [employer]/Education History  Current or Previous  Service: None         Legal  Legal Considerations: Patient/ Family Ability to Make Healthcare Decisions (Per records, Pt has a legal guardian)  Assistance with Managing/Advocating Healthcare Needs: Legal Guardian    Drug Screening  Have you used any substances (canabis, cocaine, heroin, hallucinogens, " inhalants, etc.) in the past 12 months?: No  Have you used any prescription drugs other than prescribed in the past 12 months?: No  Is a toxicology screen needed?: Yes    Stage of Change  Stage of Change: Other (Comment) (n/a)    Psychosocial  Psychosocial (WDL): Exceptions to WDL  Behaviors/Mood: Cooperative  Affect: Inconsistent with thought content  Parent/Guardian/Significant Other Involvement: No involvement  Family Behaviors: Unable to assess  Visitor Behaviors: Unable to assess  Needs Expressed: Emotional  Emotional Support Given: Reassure    Orientation  Orientation Level: Appropriate for developmental age    General Appearance  Motor Activity: Restlessness, Shuffling  Speech Pattern: Word salad, Pressured  General Attitude: Cooperative  Appearance/Hygiene: Unremarkable    Thought Process  Coherency: Disorganized, Flight of ideas, Loose associations  Content: Magical thinking, Delusions  Delusions: Paranoid, Persecutory  Perception: Hallucinations (Possibly speaking with unseen others at times.)  Hallucination: Other (Comment) (same as above)  Judgment/Insight: Poor  Confusion: Unable to assess  Cognition: Poor judgement, Poor attention/concentration    Sleep Pattern  Sleep Pattern: Insomnia    Risk Factors  Self Harm/Suicidal Ideation Plan: Denies  Previous Self Harm/Suicidal Plans: Pt denies hx SI/suicide attempts, however per records Pt has had 2 previous remote suicide attempts by hanging and jumping out a 3rd story window  Risk Factors: Lower socioeconomic status, Lower IQ, Major mental illness, Persecutory delusions, Personality disorder (antisocial, borderline), Unemployment  Description of Thoughts/Ideas Leaving Unit Now: Patient states she needs a place to live, states she will not return to [facility], but will go to a shelter    Violence Risk Assessment  Assessment of Violence: None noted  Thoughts of Harm to Others: No    Ability to Assess Risk Screen  Risk Screen - Ability to Assess: Able to be  screened  Ask Suicide-Screening Questions  1. In the past few weeks, have you wished you were dead?: No  2. In the past few weeks, have you felt that you or your family would be better off if you were dead?: No  3. In the past week, have you been having thoughts about killing yourself?: No  4. Have you ever tried to kill yourself?: No  5. Are you having thoughts of killing yourself right now?: No  Calculated Risk Score: No intervention is necessary  Alborn Suicide Severity Rating Scale (Screener/Recent Self-Report)  1. Wish to be Dead (Past 1 Month): No  2. Non-Specific Active Suicidal Thoughts (Past 1 Month): No  6. Suicidal Behavior (Lifetime): No  6. Suicidal Behavior (3 Months): No  6. Suicidal Behavior (Description): None  Calculated C-SSRS Risk Score (Lifetime/Recent): No Risk Indicated  Step 1: Risk Factors  Current & Past Psychiatric Dx: Mood disorder, Psychotic disorder, Cluster B personality disorders or traits (i.e., borderline, antisocial, histrionic & narcissistic)  Presenting Symptoms: Psychosis, Insomia  Family History: Suicidal behavior, Axis I psychiatric diagnosis requiring hospitalization (Per records, mom with schizophrenia and brother with hx suicide attempts)  Precipitants/Stressors: Social isolation, Inadequate social supports  Change in Treatment: Recent inpatient discharge, Non-compliant or not receiving treatment  Access to Lethal Methods : No  Step 3: Suicidal Ideation Intensity  Most Severe Suicidal Ideation Identified: Patient is denying current and history of SI at this time  Step 5: Documentation  Risk Level: Low suicide risk    Psychiatric Impression and Plan of Care  Assessment and Plan: SW met FTF with Pt for interview. Pt is pacing, shuffling from foot-to-foot throughout interview, but is pleasant, smiling, laughing, and attempts to be cooperative. Pt A&Ox3- uncertain about situation, as she is not able to state why she was brought to the ED. States she needs housing. Much of  "what Pt says is nonsensical, word salad. Repeats the phrase \"the ghetto needs to be traversed.\" States \"I walked to see Junie, and I fell in front of her, I had to be  for the past 35 years\". Patient stating that there is something wrong with her throat, that it is \"soft\", and it is due to a female and male nurse in the ED who are \"sorcerers\" and \"doing something with needles\" to make her throat soft. Pt denies SI/HI/AVH, however laughs inappropriately at times. Per staff, appears talking to self when no one is in the room. SW spoke with Pt's legal guardian, Gabe Linda, who stated Pt actually eloped from [facility] yesterday and is supposed to be admitted there. SW confirmed that, if Pt was recommended admission, legal guardian would want this. Guardian states yes, he feels Pt's mental health is unstable. SW then TCT [facility], who stated Pt had not eloped, but was discharged yesterday.  Plan Comments: SW discussed case with attending provider and RN. Pt recommended inpatient psychiatric placement for safety and stabilization.    Outcome/Disposition  Patient's Perception of Outcome Achieved: In agreement, states she does not want to return to [facility], but needs a place to stay.  Assessment, Recommendations and Risk Level Reviewed with: Ilsa Egan RN  Contact Name: Gabe Linda  Contact Number(s): [phone]  Contact Relationship: Legal guardian/brother-in-law  EPAT Assessment Completed Date: 01/10/24  EPAT Assessment Completed Time: 1602  Patient Disposition: Out of network facility (Specify)  Out of Network Reason: Patient requests another facility, Other (Comment) (Continuity of care; patient was discharged from [facility] yesterday)      "

## 2024-01-10 NOTE — ED NOTES
Pt came out of room screaming saying this tech was performing sorcery on her throat and to NOT DO IT AGAIN. Pt redirected back to her room.      Shahla Byers, EMT  01/10/24 9528

## 2024-01-11 VITALS
RESPIRATION RATE: 18 BRPM | TEMPERATURE: 98.1 F | OXYGEN SATURATION: 99 % | HEART RATE: 79 BPM | DIASTOLIC BLOOD PRESSURE: 68 MMHG | HEIGHT: 63 IN | SYSTOLIC BLOOD PRESSURE: 120 MMHG | BODY MASS INDEX: 43.59 KG/M2 | WEIGHT: 246 LBS

## 2024-01-11 LAB
ATRIAL RATE: 93 BPM
BACTERIA UR CULT: NORMAL
HOLD SPECIMEN: NORMAL
P AXIS: 56 DEGREES
P OFFSET: 209 MS
P ONSET: 159 MS
PR INTERVAL: 126 MS
Q ONSET: 222 MS
QRS COUNT: 15 BEATS
QRS DURATION: 78 MS
QT INTERVAL: 358 MS
QTC CALCULATION(BAZETT): 445 MS
QTC FREDERICIA: 414 MS
R AXIS: 16 DEGREES
T AXIS: 70 DEGREES
T OFFSET: 401 MS
VENTRICULAR RATE: 93 BPM

## 2024-01-11 PROCEDURE — 2500000004 HC RX 250 GENERAL PHARMACY W/ HCPCS (ALT 636 FOR OP/ED): Performed by: STUDENT IN AN ORGANIZED HEALTH CARE EDUCATION/TRAINING PROGRAM

## 2024-01-11 PROCEDURE — 2500000001 HC RX 250 WO HCPCS SELF ADMINISTERED DRUGS (ALT 637 FOR MEDICARE OP): Performed by: EMERGENCY MEDICINE

## 2024-01-11 PROCEDURE — 2500000001 HC RX 250 WO HCPCS SELF ADMINISTERED DRUGS (ALT 637 FOR MEDICARE OP): Performed by: STUDENT IN AN ORGANIZED HEALTH CARE EDUCATION/TRAINING PROGRAM

## 2024-01-11 RX ORDER — SULFAMETHOXAZOLE AND TRIMETHOPRIM 800; 160 MG/1; MG/1
1 TABLET ORAL ONCE
Status: DISCONTINUED | OUTPATIENT
Start: 2024-01-11 | End: 2024-01-11 | Stop reason: HOSPADM

## 2024-01-11 RX ORDER — OLANZAPINE 5 MG/1
15 TABLET ORAL NIGHTLY
Status: DISCONTINUED | OUTPATIENT
Start: 2024-01-11 | End: 2024-01-11 | Stop reason: HOSPADM

## 2024-01-11 RX ORDER — SULFAMETHOXAZOLE AND TRIMETHOPRIM 800; 160 MG/1; MG/1
1 TABLET ORAL 2 TIMES DAILY
Qty: 10 TABLET | Refills: 0 | Status: SHIPPED | OUTPATIENT
Start: 2024-01-11 | End: 2024-01-16

## 2024-01-11 RX ORDER — LITHIUM CARBONATE 150 MG/1
300 CAPSULE ORAL 2 TIMES DAILY
Status: DISCONTINUED | OUTPATIENT
Start: 2024-01-11 | End: 2024-01-11 | Stop reason: HOSPADM

## 2024-01-11 RX ORDER — SULFAMETHOXAZOLE AND TRIMETHOPRIM 800; 160 MG/1; MG/1
1 TABLET ORAL 2 TIMES DAILY
Qty: 10 TABLET | Refills: 0 | Status: SHIPPED | OUTPATIENT
Start: 2024-01-11 | End: 2024-01-11 | Stop reason: SDUPTHER

## 2024-01-11 RX ORDER — ZIPRASIDONE HYDROCHLORIDE 20 MG/1
20 CAPSULE ORAL
Status: DISCONTINUED | OUTPATIENT
Start: 2024-01-11 | End: 2024-01-11 | Stop reason: HOSPADM

## 2024-01-11 RX ORDER — SERTRALINE HYDROCHLORIDE 50 MG/1
50 TABLET, FILM COATED ORAL DAILY
Status: DISCONTINUED | OUTPATIENT
Start: 2024-01-11 | End: 2024-01-11 | Stop reason: HOSPADM

## 2024-01-11 RX ADMIN — LITHIUM CARBONATE 300 MG: 150 CAPSULE, GELATIN COATED ORAL at 14:19

## 2024-01-11 RX ADMIN — LITHIUM CARBONATE 300 MG: 150 CAPSULE, GELATIN COATED ORAL at 20:09

## 2024-01-11 RX ADMIN — ZIPRASIDONE HYDROCHLORIDE 20 MG: 20 CAPSULE ORAL at 09:17

## 2024-01-11 RX ADMIN — ZIPRASIDONE HYDROCHLORIDE 20 MG: 20 CAPSULE ORAL at 16:39

## 2024-01-11 RX ADMIN — SERTRALINE 50 MG: 50 TABLET, FILM COATED ORAL at 14:19

## 2024-01-11 ASSESSMENT — COLUMBIA-SUICIDE SEVERITY RATING SCALE - C-SSRS
1. SINCE LAST CONTACT, HAVE YOU WISHED YOU WERE DEAD OR WISHED YOU COULD GO TO SLEEP AND NOT WAKE UP?: NO
1. SINCE LAST CONTACT, HAVE YOU WISHED YOU WERE DEAD OR WISHED YOU COULD GO TO SLEEP AND NOT WAKE UP?: NO
2. HAVE YOU ACTUALLY HAD ANY THOUGHTS OF KILLING YOURSELF?: NO
6. HAVE YOU EVER DONE ANYTHING, STARTED TO DO ANYTHING, OR PREPARED TO DO ANYTHING TO END YOUR LIFE?: NO
6. HAVE YOU EVER DONE ANYTHING, STARTED TO DO ANYTHING, OR PREPARED TO DO ANYTHING TO END YOUR LIFE?: NO
2. HAVE YOU ACTUALLY HAD ANY THOUGHTS OF KILLING YOURSELF?: NO

## 2024-01-11 ASSESSMENT — PAIN - FUNCTIONAL ASSESSMENT: PAIN_FUNCTIONAL_ASSESSMENT: 0-10

## 2024-01-11 ASSESSMENT — PAIN SCALES - GENERAL: PAINLEVEL_OUTOF10: 0 - NO PAIN

## 2024-01-11 NOTE — PROGRESS NOTES
Patient was received in signout at 8:45 AM.     IN BRIEF   53-year-old female presents with paranoid delusions.  Patient has a history of schizoaffective disorder.    At the time of signout, patient is pending placement after social work evaluation.     ED COURSE   During my ED shift, no acute events for the patient.    Patient reportedly not excepted to [facility] as there was concern that she may be at her baseline.  When I spoke with the patient, patient is convinced that she is having electroshock therapy being performed on her in the emergency department.  Patient states she has not taken her medications.  She was able to be convinced to take Geodon by the nursing staff.  I have ordered her home medications as prescribed per records at her facility.  We will attempt to give her her medications here in the emergency department.  Patient is still pending placement at the end of my shift.    FINAL IMPRESSION      1. Schizoaffective disorder, unspecified type (CMS/[facility])    2. Urinary tract infection without hematuria, site unspecified          DISPOSITION    Transfer To Another Facility 01/11/2024 12:56:18 AM
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
## 2024-01-11 NOTE — PROGRESS NOTES
Social Work Note  Pt was referred to [facility] on previous shift and declined due to lack of Medicare bed days (3 total left). Pt referred to [facility].    10:00 Pt declined at [facility] stating they are unable to accept due to insurance reasons.    10:10-10:17 Vicente spoke with pts guardian who is in agreement with referrals to additional hospitals including [facility] and [facility]. Pt's guardian reports pt resides at [facility] and is able to return upon discharge from an inpatient hospital stay.     10:30 Vicente spoke with Maggy at [facility] who ran pt's insurance and confirmed they can accept pts Medicare with only 3 days left and bill under her Medicaid secondary coverage. Referral made to [facility].     12:19 Call from [facility], Dr Becerril declined pt due to pts recent discharge from [facility] and notes stating pt agreed to take her medication. W is feeling pt is at her baseline. Vicente called pts guardian for additional collateral, he did not answer and does not have mailbox set up.      Vicente spoke with nurse. Pt is still very delusion. Pt did agree to take Geodon however refused Bactrim due to feeling it was poison. Geodon was prescribed by ED doc and is not known to be one of her regular medications. Nurse is working on getting an updated medication list. Vicente will continue to reach out to pt''s guardian to obtain more information on pt's baseline. Vicente will continue to seek placement as pt continues to meet criteria.     13:40 Referral made to [facility].

## 2024-01-11 NOTE — PROGRESS NOTES
"    Subjective   53-year-old female initially evaluated by Dr. Turner for acute psychosis in the setting of medical noncompliance.  The patient has fixed delusions, however these are getting worse and now showing potential for       Objective     Physical Exam  Constitutional:       General: She is not in acute distress.     Appearance: She is obese.   Neurological:      Mental Status: She is alert.   Psychiatric:         Attention and Perception: Attention normal.         Mood and Affect: Affect is flat.         Speech: Speech normal.         Thought Content: Thought content is paranoid and delusional. Thought content does not include homicidal or suicidal ideation.         Cognition and Memory: Cognition normal.         Judgment: Judgment is impulsive.         Last Recorded Vitals  Blood pressure 139/78, pulse 87, temperature 36.8 °C (98.2 °F), temperature source Oral, resp. rate 17, height 1.6 m (5' 3\"), weight 112 kg (246 lb), SpO2 100 %.             Assessment/Plan   Urinary tract infection not meeting SIRS criteria and without acute kidney injury.  Given Bactrim and prescription placed on chart.  Patient's other lab work is reviewed and within normal limits, her vital signs are also within normal limits.  She is currently medically cleared for transfer.    Salina Baker MD      "

## 2024-01-11 NOTE — ED NOTES
Asa was called into room 22 by the patient and she went in to talk to the patient and she came out and let us know she is very delusional in her thoughts about Latter day such as cremation, and feeling like the room next to her is electrocuting her, and that Dr Galvan is trying to take her soul. Her delusional thoughts were passed on during bedside at shift change. I'm aware of her Presybeterian delusion. Patient has been calm and quiet since I came on shift at 7AM.      Makenzie Gutierrez, EMT  01/11/24 0798

## 2024-01-11 NOTE — PROGRESS NOTES
"Social Work Note  Sw spoke with pt this morning. She is calm and cooperative however continues to express delusional thoughts and Orthodox preoccupation i.e. \"I am God\", \"Dr Serrano is trying to steal my soul\" and \"the person in the room next to me is electrocuting me at night\".  Pt has no insight into her current mental state. Pt remains calm at this time and sw reassured her we will help her. Pt pending  [place] review this morning.   "

## 2024-01-11 NOTE — ED NOTES
Spoke to [facility] for an updated medication list. Pt is prescribed zyprexa 15mg nightly. Zoloft 50mg in the am. And 300mg of lithium BID. Social work and Dr. Cruz notified.      Nancy Cruz, NILDA  01/11/24 3729

## 2024-01-11 NOTE — ED NOTES
PATIENT BREAKFAST ORDERED. PER DIETARY IT'LL BE DELIVERED IN ABOUT 45 MIN.      Makenzie Gutierrez, EMT  01/11/24 0811

## 2024-01-12 NOTE — PROGRESS NOTES
Patient was initially seen by my colleague and endorsed to me on signout.    Briefly, patient is a 53-year-old female that presented to the emergency room for psychiatric evaluation.  Patient is delusional and paranoid.  She has remained calm and cooperative throughout stay in the emergency department.  She was seen and evaluated by my colleague and medically cleared.  Patient was attempted be placed at multiple different facilities however these facilities are very familiar with the patient and states that this is her baseline from previous admissions.  Patient declined from all facilities at this time.  In further discussion with patient's guardian patient will be discharged back to her psychiatric group home facility at this time.

## 2024-01-12 NOTE — PROGRESS NOTES
Social Work Note  Pt request of legal guardian, Pt was referred to inpatient psychiatric facilities for further stabilization. Pt was declined by all facilities referred to, due to both insurance restrictions and providers feeling Pt was at her mental status baseline and did not meet criteria for admission. Pt has not expressed SI/HI/command hallucinations since admit. Pt with hx aggressive behavior when decompensating, but has not had any instances of aggressive behavior while in ED. She has not required physical restraints or sedating medications. She has hx noncompliance with her psychiatric medications, but has taken them as prescribed since they were ordered by provider. Pt has been overall cooperative with care. EDOUARD TCT Pt's legal guardian, Gabe Linda, to update him. Guardian verbalized understanding, and gave consent for Pt to be discharged back to [facility], where Pt resides on a locked mental health unit. Guardian emphasizes he would like staff to be aware that Pt is a flight risk; Pt eloped from [facility] 3 weeks ago, and then eloped from EMS following discharge from [facility] on 1/9. EDOUARD discussed with attending provider, who is in agreement with plan to discharge Pt back to [facility], as inpatient placement is not possible at this time. EDOUARD TCT [facility] and informed staff of Pt's transport set for this evening back to facility.

## 2024-10-14 ENCOUNTER — HOSPITAL ENCOUNTER (OUTPATIENT)
Facility: HOSPITAL | Age: 54
Setting detail: OBSERVATION
Discharge: PSYCHIATRIC OR SUBSTANCE USE FACILITY | End: 2024-10-18
Attending: EMERGENCY MEDICINE | Admitting: EMERGENCY MEDICINE
Payer: COMMERCIAL

## 2024-10-14 ENCOUNTER — CLINICAL SUPPORT (OUTPATIENT)
Dept: EMERGENCY MEDICINE | Facility: HOSPITAL | Age: 54
End: 2024-10-14
Payer: COMMERCIAL

## 2024-10-14 DIAGNOSIS — F22 DELUSIONS (MULTI): ICD-10-CM

## 2024-10-14 DIAGNOSIS — R44.3 HALLUCINATIONS: Primary | ICD-10-CM

## 2024-10-14 LAB
ALBUMIN SERPL BCP-MCNC: 3.9 G/DL (ref 3.4–5)
ALP SERPL-CCNC: 72 U/L (ref 33–110)
ALT SERPL W P-5'-P-CCNC: 20 U/L (ref 7–45)
AMPHETAMINES UR QL SCN: NORMAL
ANION GAP SERPL CALC-SCNC: 14 MMOL/L (ref 10–20)
APAP SERPL-MCNC: <10 UG/ML
AST SERPL W P-5'-P-CCNC: 20 U/L (ref 9–39)
BARBITURATES UR QL SCN: NORMAL
BASOPHILS # BLD AUTO: 0.02 X10*3/UL (ref 0–0.1)
BASOPHILS NFR BLD AUTO: 0.3 %
BENZODIAZ UR QL SCN: NORMAL
BILIRUB SERPL-MCNC: 0.3 MG/DL (ref 0–1.2)
BUN SERPL-MCNC: 16 MG/DL (ref 6–23)
BZE UR QL SCN: NORMAL
CALCIUM SERPL-MCNC: 9.6 MG/DL (ref 8.6–10.6)
CANNABINOIDS UR QL SCN: NORMAL
CHLORIDE SERPL-SCNC: 107 MMOL/L (ref 98–107)
CO2 SERPL-SCNC: 22 MMOL/L (ref 21–32)
CREAT SERPL-MCNC: 0.96 MG/DL (ref 0.5–1.05)
EGFRCR SERPLBLD CKD-EPI 2021: 70 ML/MIN/1.73M*2
EOSINOPHIL # BLD AUTO: 0.23 X10*3/UL (ref 0–0.7)
EOSINOPHIL NFR BLD AUTO: 3.2 %
ERYTHROCYTE [DISTWIDTH] IN BLOOD BY AUTOMATED COUNT: 13.2 % (ref 11.5–14.5)
ETHANOL SERPL-MCNC: <10 MG/DL
FENTANYL+NORFENTANYL UR QL SCN: NORMAL
GLUCOSE SERPL-MCNC: 128 MG/DL (ref 74–99)
HCT VFR BLD AUTO: 37.3 % (ref 36–46)
HGB BLD-MCNC: 11.7 G/DL (ref 12–16)
HOLD SPECIMEN: NORMAL
HOLD SPECIMEN: NORMAL
IMM GRANULOCYTES # BLD AUTO: 0.02 X10*3/UL (ref 0–0.7)
IMM GRANULOCYTES NFR BLD AUTO: 0.3 % (ref 0–0.9)
LITHIUM SERPL-SCNC: 0.36 MMOL/L (ref 0.6–1.2)
LYMPHOCYTES # BLD AUTO: 2.22 X10*3/UL (ref 1.2–4.8)
LYMPHOCYTES NFR BLD AUTO: 30.7 %
MCH RBC QN AUTO: 30 PG (ref 26–34)
MCHC RBC AUTO-ENTMCNC: 31.4 G/DL (ref 32–36)
MCV RBC AUTO: 96 FL (ref 80–100)
METHADONE UR QL SCN: NORMAL
MONOCYTES # BLD AUTO: 0.53 X10*3/UL (ref 0.1–1)
MONOCYTES NFR BLD AUTO: 7.3 %
NEUTROPHILS # BLD AUTO: 4.22 X10*3/UL (ref 1.2–7.7)
NEUTROPHILS NFR BLD AUTO: 58.2 %
NRBC BLD-RTO: 0 /100 WBCS (ref 0–0)
OPIATES UR QL SCN: NORMAL
OXYCODONE+OXYMORPHONE UR QL SCN: NORMAL
PCP UR QL SCN: NORMAL
PLATELET # BLD AUTO: 326 X10*3/UL (ref 150–450)
POTASSIUM SERPL-SCNC: 4.3 MMOL/L (ref 3.5–5.3)
PREGNANCY TEST URINE, POC: NEGATIVE
PROT SERPL-MCNC: 6.5 G/DL (ref 6.4–8.2)
RBC # BLD AUTO: 3.9 X10*6/UL (ref 4–5.2)
SALICYLATES SERPL-MCNC: <3 MG/DL
SODIUM SERPL-SCNC: 139 MMOL/L (ref 136–145)
WBC # BLD AUTO: 7.2 X10*3/UL (ref 4.4–11.3)

## 2024-10-14 PROCEDURE — 85025 COMPLETE CBC W/AUTO DIFF WBC: CPT

## 2024-10-14 PROCEDURE — 99285 EMERGENCY DEPT VISIT HI MDM: CPT

## 2024-10-14 PROCEDURE — 80053 COMPREHEN METABOLIC PANEL: CPT

## 2024-10-14 PROCEDURE — 81025 URINE PREGNANCY TEST: CPT

## 2024-10-14 PROCEDURE — 80178 ASSAY OF LITHIUM: CPT

## 2024-10-14 PROCEDURE — 80061 LIPID PANEL: CPT | Performed by: PSYCHIATRY & NEUROLOGY

## 2024-10-14 PROCEDURE — 84443 ASSAY THYROID STIM HORMONE: CPT | Performed by: PSYCHIATRY & NEUROLOGY

## 2024-10-14 PROCEDURE — 36415 COLL VENOUS BLD VENIPUNCTURE: CPT

## 2024-10-14 PROCEDURE — 93010 ELECTROCARDIOGRAM REPORT: CPT | Performed by: EMERGENCY MEDICINE

## 2024-10-14 PROCEDURE — 2500000001 HC RX 250 WO HCPCS SELF ADMINISTERED DRUGS (ALT 637 FOR MEDICARE OP)

## 2024-10-14 PROCEDURE — 80320 DRUG SCREEN QUANTALCOHOLS: CPT

## 2024-10-14 PROCEDURE — 93005 ELECTROCARDIOGRAM TRACING: CPT

## 2024-10-14 PROCEDURE — 99285 EMERGENCY DEPT VISIT HI MDM: CPT | Performed by: EMERGENCY MEDICINE

## 2024-10-14 PROCEDURE — 80307 DRUG TEST PRSMV CHEM ANLYZR: CPT

## 2024-10-14 RX ORDER — TRAZODONE HYDROCHLORIDE 50 MG/1
100 TABLET ORAL NIGHTLY
Status: DISCONTINUED | OUTPATIENT
Start: 2024-10-14 | End: 2024-10-15

## 2024-10-14 RX ORDER — HALOPERIDOL 5 MG/1
10 TABLET ORAL NIGHTLY
Status: DISCONTINUED | OUTPATIENT
Start: 2024-10-14 | End: 2024-10-15

## 2024-10-14 RX ADMIN — TRAZODONE HYDROCHLORIDE 100 MG: 50 TABLET ORAL at 22:57

## 2024-10-14 RX ADMIN — HALOPERIDOL 10 MG: 5 TABLET ORAL at 22:57

## 2024-10-14 SDOH — SOCIAL STABILITY: SOCIAL INSECURITY: FAMILY BEHAVIORS: CALM

## 2024-10-14 SDOH — HEALTH STABILITY: MENTAL HEALTH: BEHAVIORS/MOOD: FLAT AFFECT

## 2024-10-14 SDOH — HEALTH STABILITY: MENTAL HEALTH: NEEDS EXPRESSED: DENIES

## 2024-10-14 SDOH — HEALTH STABILITY: MENTAL HEALTH: HALLUCINATION: AUDITORY

## 2024-10-14 SDOH — HEALTH STABILITY: MENTAL HEALTH: DELUSIONS: PARANOID

## 2024-10-14 SDOH — HEALTH STABILITY: MENTAL HEALTH: FOR HIGH RISK PATIENTS: 1:1 PATIENT OBSERVER AT ALL TIMES;ALL INTERVENTIONS ABOVE, PLUS:

## 2024-10-14 SDOH — HEALTH STABILITY: MENTAL HEALTH: BEHAVIORAL HEALTH(WDL): EXCEPTIONS TO WDL

## 2024-10-14 SDOH — HEALTH STABILITY: MENTAL HEALTH: CONTENT: BLAMING OTHERS

## 2024-10-14 SDOH — HEALTH STABILITY: MENTAL HEALTH: BEHAVIORS/MOOD: SAD

## 2024-10-14 SDOH — HEALTH STABILITY: MENTAL HEALTH: DELUSIONS: RELIGIOUS

## 2024-10-14 ASSESSMENT — LIFESTYLE VARIABLES
EVER FELT BAD OR GUILTY ABOUT YOUR DRINKING: NO
HAVE YOU EVER FELT YOU SHOULD CUT DOWN ON YOUR DRINKING: NO
TOTAL SCORE: 0
EVER HAD A DRINK FIRST THING IN THE MORNING TO STEADY YOUR NERVES TO GET RID OF A HANGOVER: NO
HAVE PEOPLE ANNOYED YOU BY CRITICIZING YOUR DRINKING: NO

## 2024-10-14 ASSESSMENT — COLUMBIA-SUICIDE SEVERITY RATING SCALE - C-SSRS
6. HAVE YOU EVER DONE ANYTHING, STARTED TO DO ANYTHING, OR PREPARED TO DO ANYTHING TO END YOUR LIFE?: YES
1. IN THE PAST MONTH, HAVE YOU WISHED YOU WERE DEAD OR WISHED YOU COULD GO TO SLEEP AND NOT WAKE UP?: YES
2. HAVE YOU ACTUALLY HAD ANY THOUGHTS OF KILLING YOURSELF?: YES
4. HAVE YOU HAD THESE THOUGHTS AND HAD SOME INTENTION OF ACTING ON THEM?: NO
5. HAVE YOU STARTED TO WORK OUT OR WORKED OUT THE DETAILS OF HOW TO KILL YOURSELF? DO YOU INTEND TO CARRY OUT THIS PLAN?: NO
6. HAVE YOU EVER DONE ANYTHING, STARTED TO DO ANYTHING, OR PREPARED TO DO ANYTHING TO END YOUR LIFE?: NO

## 2024-10-14 ASSESSMENT — PAIN - FUNCTIONAL ASSESSMENT: PAIN_FUNCTIONAL_ASSESSMENT: 0-10

## 2024-10-14 ASSESSMENT — PAIN SCALES - GENERAL: PAINLEVEL_OUTOF10: 0 - NO PAIN
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
## 2024-10-15 PROBLEM — R44.0 AUDITORY HALLUCINATION: Status: ACTIVE | Noted: 2024-10-15

## 2024-10-15 LAB
ATRIAL RATE: 77 BPM
P AXIS: 75 DEGREES
P OFFSET: 178 MS
P ONSET: 139 MS
PR INTERVAL: 150 MS
Q ONSET: 214 MS
QRS COUNT: 13 BEATS
QRS DURATION: 76 MS
QT INTERVAL: 380 MS
QTC CALCULATION(BAZETT): 430 MS
QTC FREDERICIA: 412 MS
R AXIS: 4 DEGREES
SARS-COV-2 RNA RESP QL NAA+PROBE: NOT DETECTED
T AXIS: 78 DEGREES
T OFFSET: 404 MS
VENTRICULAR RATE: 77 BPM

## 2024-10-15 PROCEDURE — 2500000001 HC RX 250 WO HCPCS SELF ADMINISTERED DRUGS (ALT 637 FOR MEDICARE OP)

## 2024-10-15 PROCEDURE — 2500000005 HC RX 250 GENERAL PHARMACY W/O HCPCS: Performed by: STUDENT IN AN ORGANIZED HEALTH CARE EDUCATION/TRAINING PROGRAM

## 2024-10-15 PROCEDURE — G0378 HOSPITAL OBSERVATION PER HR: HCPCS

## 2024-10-15 PROCEDURE — 87635 SARS-COV-2 COVID-19 AMP PRB: CPT

## 2024-10-15 PROCEDURE — 2500000002 HC RX 250 W HCPCS SELF ADMINISTERED DRUGS (ALT 637 FOR MEDICARE OP, ALT 636 FOR OP/ED)

## 2024-10-15 RX ORDER — HALOPERIDOL DECANOATE 100 MG/ML
150 INJECTION INTRAMUSCULAR
COMMUNITY
End: 2024-10-23 | Stop reason: HOSPADM

## 2024-10-15 RX ORDER — ATORVASTATIN CALCIUM 40 MG/1
40 TABLET, FILM COATED ORAL NIGHTLY
COMMUNITY

## 2024-10-15 RX ORDER — TRAZODONE HYDROCHLORIDE 100 MG/1
100 TABLET ORAL NIGHTLY
COMMUNITY
End: 2024-10-23 | Stop reason: HOSPADM

## 2024-10-15 RX ORDER — HALOPERIDOL 5 MG/1
20 TABLET ORAL 2 TIMES DAILY
Status: DISCONTINUED | OUTPATIENT
Start: 2024-10-15 | End: 2024-10-16

## 2024-10-15 RX ORDER — PANTOPRAZOLE SODIUM 40 MG/1
40 TABLET, DELAYED RELEASE ORAL EVERY MORNING
Status: DISCONTINUED | OUTPATIENT
Start: 2024-10-16 | End: 2024-10-18 | Stop reason: HOSPADM

## 2024-10-15 RX ORDER — ADHESIVE BANDAGE
30 BANDAGE TOPICAL DAILY PRN
COMMUNITY

## 2024-10-15 RX ORDER — MULTIVIT-MIN/IRON FUM/FOLIC AC 7.5 MG-4
1 TABLET ORAL DAILY
COMMUNITY

## 2024-10-15 RX ORDER — TRAZODONE HYDROCHLORIDE 50 MG/1
100 TABLET ORAL NIGHTLY
Status: DISCONTINUED | OUTPATIENT
Start: 2024-10-15 | End: 2024-10-16

## 2024-10-15 RX ORDER — ENEMA 19; 7 G/133ML; G/133ML
1 ENEMA RECTAL AS NEEDED
COMMUNITY

## 2024-10-15 RX ORDER — ATORVASTATIN CALCIUM 20 MG/1
40 TABLET, FILM COATED ORAL NIGHTLY
Status: DISCONTINUED | OUTPATIENT
Start: 2024-10-15 | End: 2024-10-18 | Stop reason: HOSPADM

## 2024-10-15 RX ORDER — HALOPERIDOL 20 MG/1
20 TABLET ORAL 2 TIMES DAILY
COMMUNITY
End: 2024-10-23 | Stop reason: HOSPADM

## 2024-10-15 RX ORDER — ONDANSETRON 4 MG/1
4 TABLET, ORALLY DISINTEGRATING ORAL EVERY 8 HOURS PRN
Status: DISCONTINUED | OUTPATIENT
Start: 2024-10-15 | End: 2024-10-18 | Stop reason: HOSPADM

## 2024-10-15 RX ORDER — ADHESIVE BANDAGE
30 BANDAGE TOPICAL ONCE
Status: DISCONTINUED | OUTPATIENT
Start: 2024-10-15 | End: 2024-10-18 | Stop reason: HOSPADM

## 2024-10-15 RX ORDER — OLANZAPINE 20 MG/1
20 TABLET ORAL NIGHTLY
COMMUNITY

## 2024-10-15 RX ORDER — LITHIUM CARBONATE 300 MG/1
300 CAPSULE ORAL 3 TIMES DAILY
Status: DISCONTINUED | OUTPATIENT
Start: 2024-10-15 | End: 2024-10-18 | Stop reason: HOSPADM

## 2024-10-15 RX ORDER — LITHIUM CARBONATE 300 MG/1
300 CAPSULE ORAL
COMMUNITY

## 2024-10-15 RX ORDER — PANTOPRAZOLE SODIUM 40 MG/1
40 TABLET, DELAYED RELEASE ORAL
COMMUNITY

## 2024-10-15 RX ORDER — OLANZAPINE 5 MG/1
20 TABLET ORAL NIGHTLY
Status: DISCONTINUED | OUTPATIENT
Start: 2024-10-15 | End: 2024-10-18 | Stop reason: HOSPADM

## 2024-10-15 RX ADMIN — OLANZAPINE 20 MG: 5 TABLET, FILM COATED ORAL at 20:56

## 2024-10-15 RX ADMIN — ONDANSETRON 4 MG: 4 TABLET, ORALLY DISINTEGRATING ORAL at 09:19

## 2024-10-15 RX ADMIN — LITHIUM CARBONATE 300 MG: 300 CAPSULE, GELATIN COATED ORAL at 21:16

## 2024-10-15 RX ADMIN — Medication 1 TABLET: at 21:16

## 2024-10-15 RX ADMIN — TRAZODONE HYDROCHLORIDE 100 MG: 50 TABLET ORAL at 20:56

## 2024-10-15 RX ADMIN — HALOPERIDOL 20 MG: 5 TABLET ORAL at 20:56

## 2024-10-15 RX ADMIN — ATORVASTATIN CALCIUM 40 MG: 20 TABLET, FILM COATED ORAL at 20:56

## 2024-10-15 SDOH — HEALTH STABILITY: MENTAL HEALTH: NEEDS EXPRESSED: DENIES;SPIRITUAL

## 2024-10-15 SDOH — HEALTH STABILITY: MENTAL HEALTH
HAVE YOU STARTED TO WORK OUT OR WORKED OUT THE DETAILS OF HOW TO KILL YOURSELF? DO YOU INTENT TO CARRY OUT THIS PLAN?: NO

## 2024-10-15 SDOH — HEALTH STABILITY: MENTAL HEALTH: HAVE YOU WISHED YOU WERE DEAD OR WISHED YOU COULD GO TO SLEEP AND NOT WAKE UP?: NO

## 2024-10-15 SDOH — HEALTH STABILITY: MENTAL HEALTH: HAVE YOU BEEN THINKING ABOUT HOW YOU MIGHT DO THIS?: NO

## 2024-10-15 SDOH — HEALTH STABILITY: MENTAL HEALTH: BEHAVIORS/MOOD: COOPERATIVE;CALM;DELUSIONS

## 2024-10-15 SDOH — HEALTH STABILITY: MENTAL HEALTH: CONTENT: BLAMING OTHERS;DELUSIONS

## 2024-10-15 SDOH — HEALTH STABILITY: MENTAL HEALTH: WAS THIS WITHIN THE PAST THREE MONTHS?: NO

## 2024-10-15 SDOH — HEALTH STABILITY: MENTAL HEALTH: HALLUCINATION: AUDITORY

## 2024-10-15 SDOH — HEALTH STABILITY: MENTAL HEALTH: BEHAVIORAL HEALTH(WDL): EXCEPTIONS TO WDL

## 2024-10-15 SDOH — HEALTH STABILITY: MENTAL HEALTH: HAVE YOU ACTUALLY HAD ANY THOUGHTS OF KILLING YOURSELF?: NO

## 2024-10-15 SDOH — HEALTH STABILITY: MENTAL HEALTH: HAVE YOU HAD THESE THOUGHTS AND HAD SOME INTENTION OF ACTING ON THEM?: NO

## 2024-10-15 SDOH — HEALTH STABILITY: MENTAL HEALTH: DELUSIONS: PARANOID;RELIGIOUS

## 2024-10-15 SDOH — HEALTH STABILITY: MENTAL HEALTH: HAVE YOU EVER DONE ANYTHING, STARTED TO DO ANYTHING, OR PREPARED TO DO ANYTHING TO END YOUR LIFE?: NO

## 2024-10-15 SDOH — SOCIAL STABILITY: SOCIAL INSECURITY: FAMILY BEHAVIORS: CALM

## 2024-10-15 SDOH — HEALTH STABILITY: MENTAL HEALTH: RISK OF SUICIDE: NO RISK

## 2024-10-15 ASSESSMENT — PAIN SCALES - GENERAL
PAINLEVEL_OUTOF10: 0 - NO PAIN

## 2024-10-15 ASSESSMENT — COLUMBIA-SUICIDE SEVERITY RATING SCALE - C-SSRS
2. HAVE YOU ACTUALLY HAD ANY THOUGHTS OF KILLING YOURSELF?: NO
1. SINCE LAST CONTACT, HAVE YOU WISHED YOU WERE DEAD OR WISHED YOU COULD GO TO SLEEP AND NOT WAKE UP?: NO
6. HAVE YOU EVER DONE ANYTHING, STARTED TO DO ANYTHING, OR PREPARED TO DO ANYTHING TO END YOUR LIFE?: NO
5. HAVE YOU STARTED TO WORK OUT OR WORKED OUT THE DETAILS OF HOW TO KILL YOURSELF? DO YOU INTEND TO CARRY OUT THIS PLAN?: NO

## 2024-10-15 NOTE — H&P
"Observation History and Physical  [facility] EMERGENCY MEDICINE           History of Present Illness     History provided by: Patient  Limitations to History: None  External Records Reviewed: Previous providers notes, EPAT notes    Patient History:  Guadalupe Lloyd is a 54 y.o. female who presented to the emergency department today for psychiatric evaluation.  Patient has a history of schizoaffective disorder and has been having worsening delusions, auditory hallucinations lately.  Is not currently taking any medications.  Appears psychiatrically decompensated and after EPAT evaluation they believe he meets criteria today for inpatient hospital admission.  We agree with this assessment.    Guadalupe Lloyd was escalated to observation status. Observation was necessary as they continue to require treatment and monitoring of their psychiatric illness while awaiting inpatient behavioral health bed availability.    Physical Exam     Visit Vitals  BP (!) 197/95 (BP Location: Right arm, Patient Position: Sitting)   Pulse 89   Temp 36.4 °C (97.5 °F) (Tympanic)   Resp 18   Ht 1.6 m (5' 3\")   Wt 112 kg (246 lb)   SpO2 95%   BMI 43.58 kg/m²   Smoking Status Never   BSA 2.23 m²       GENERAL:  The patient appears nourished and normally developed. Vital signs as documented.     PULMONARY:  Without any respiratory distress. Able to speak full sentences, no accessory muscle use    CARDIAC: Warm and well perfused. No cyanosis.    MUSCULOSKELETAL:   Able to ambulate, Non edematous, with no obvious deformities.     SKIN: No pallor. Intact.    NEURO:  No obvious neurological deficits.  Able to follow commands.    Psych: Pleasant, calm, redirectable.  Does appear delusional for us.      Impression and Plan           Guadalupe Lloyd under observation status in [facility] EMERGENCY MEDICINE for psychiatric illness monitoring and treatment while awaiting inpatient behavioral health bed availability.   Emergency " Psychiatric Assessment Team has been consulted. Case discussed with them and decision for inpatient hospitalization deemed necessary. Patient is medically clear at this time.     Home medication reconciliation was reviewed and restarted where clinically indicated.     Patient and Family updated on plan of care.     Jasper Chen MD

## 2024-10-15 NOTE — PROGRESS NOTES
"Observation Progress Note  [facility] EMERGENCY MEDICINE           Guadalupe Lloyd is a 54 y.o. female on day 0 of observation awaiting psychiatric bed availability and admission for aggression.  Patient requires inpatient psychiatric hospitalization for decompensated psychosis.        Physical Exam     GENERAL:  The patient appears nourished and normally developed. Vital signs as documented.     PULMONARY:  Without any respiratory distress. Able to speak full sentences, no accessory muscle use    CARDIAC: Warm and well perfused. No cyanosis.    MUSCULOSKELETAL:   Able to ambulate, Non edematous, with no obvious deformities.     SKIN: No pallor. Intact.    NEURO:  No obvious neurological deficits.  Able to follow commands.    Psych: Concerned that [facility] is a cult.  Flight of ideas, tangential speech      Last Recorded Vitals  Blood pressure (!) 174/95, pulse 69, temperature 36.5 °C (97.7 °F), temperature source Temporal, resp. rate 16, height 1.6 m (5' 3\"), weight 112 kg (246 lb), SpO2 96%.  Intake/Output last 3 Shifts:  No intake/output data recorded.    Scheduled medications  haloperidol, 10 mg, oral, Nightly  traZODone, 100 mg, oral, Nightly      Continuous medications     PRN medications      Impression and Plan          Guadalupe Lloyd remains under observation status in [facility] EMERGENCY MEDICINE for psychiatric illness monitoring and treatment while awaiting inpatient behavioral health bed availability.     Pharmacy consulted for med rec.    Psychiatric consult service recommendations reviewed, case discussed with them and decision for inpatient hospitalization continues to be necessary.     Patient and Family updated on plan of care.     Luisito Tafoya MD                 "

## 2024-10-15 NOTE — PROGRESS NOTES
Pharmacy Medication History Review    Guadalupe Lloyd is a 54 y.o. female admitted for Auditory hallucination. Pharmacy reviewed the patient's [id number] medications and allergies for accuracy.    Medications ADDED:  All current meds  Medications CHANGED:  N/A  Medications REMOVED:   N/A     The list below reflects the updated PTA list.   Prior to Admission Medications   Prescriptions Last Dose Informant   DULCOLAX, BISACODYL, RECT  Other   Sig: Insert 1 suppository into the rectum once daily as needed.   OLANZapine (ZyPREXA) 20 mg tablet  Other   Sig: Take 1 tablet (20 mg) by mouth once daily at bedtime.   atorvastatin (Lipitor) 40 mg tablet  Other   Sig: Take 1 tablet (40 mg) by mouth once daily at bedtime.   haloperidol (Haldol) 20 mg tablet  Other   Sig: Take 1 tablet (20 mg) by mouth 2 times a day.   haloperidol decanoate (Haldol Decanoate) 100 mg/mL injection  Other   Sig: Inject 1.5 mL (150 mg) into the muscle every 14 (fourteen) days.   lithium 300 mg capsule  Other   Sig: Take 1 capsule (300 mg) by mouth 3 times daily (morning, midday, late afternoon).   magnesium hydroxide (Milk of Magnesia) 400 mg/5 mL suspension  Other   Sig: Take 30 mL by mouth once daily as needed for constipation.   multivitamin with minerals tablet  Other   Sig: Take 1 tablet by mouth once daily.   pantoprazole (ProtoNix) 40 mg EC tablet  Other   Sig: Take 1 tablet (40 mg) by mouth once daily in the morning. Take before meals. Do not crush, chew, or split.   sodium phosphates (Fleet Enema) 19-7 gram/118 mL enema enema  Other   Sig: Insert 133 mL (1 enema) into the rectum if needed for constipation.   traZODone (Desyrel) 100 mg tablet  Other   Sig: Take 1 tablet (100 mg) by mouth once daily at bedtime.      Facility-Administered Medications: None        The list below reflects the updated allergy list. Please review each documented allergy for additional clarification and justification.  Allergies  Reviewed by Breanna Phoenix on  "10/15/2024        Severity Reactions Comments    Penicillin G High Anaphylaxis     [place] Not Specified Unknown     Clozapine Not Specified Unknown     Peach Not Specified Unknown     Peanut Not Specified Unknown             Patient declines M2B at discharge (patient will go back to SNF).     Sources:   Sources included outpatient fill history with [facility], [facility], and [facility] sent a med list with patient with active medications as of 10/14/24    Additional Comments:  Nursing staff at [facility] could not confirm last dose of Haldol 150mg IM injection every 14 days.  They report patient refused last dose on 10/2/24.      CARA GUERRA [place]  Pharmacy Technician  10/15/24     Secure Chat preferred   If no response call [phone] or [employer] \"Med Rec\"   "

## 2024-10-16 PROCEDURE — 2500000002 HC RX 250 W HCPCS SELF ADMINISTERED DRUGS (ALT 637 FOR MEDICARE OP, ALT 636 FOR OP/ED)

## 2024-10-16 PROCEDURE — 2500000004 HC RX 250 GENERAL PHARMACY W/ HCPCS (ALT 636 FOR OP/ED): Performed by: PHYSICIAN ASSISTANT

## 2024-10-16 PROCEDURE — G0378 HOSPITAL OBSERVATION PER HR: HCPCS

## 2024-10-16 PROCEDURE — 2500000001 HC RX 250 WO HCPCS SELF ADMINISTERED DRUGS (ALT 637 FOR MEDICARE OP): Performed by: PHYSICIAN ASSISTANT

## 2024-10-16 PROCEDURE — 2500000001 HC RX 250 WO HCPCS SELF ADMINISTERED DRUGS (ALT 637 FOR MEDICARE OP)

## 2024-10-16 PROCEDURE — 96372 THER/PROPH/DIAG INJ SC/IM: CPT | Performed by: PHYSICIAN ASSISTANT

## 2024-10-16 RX ORDER — HALOPERIDOL 5 MG/1
10 TABLET ORAL 2 TIMES DAILY
Status: DISCONTINUED | OUTPATIENT
Start: 2024-10-16 | End: 2024-10-18 | Stop reason: HOSPADM

## 2024-10-16 RX ORDER — HALOPERIDOL DECANOATE 100 MG/ML
150 INJECTION INTRAMUSCULAR ONCE
Status: COMPLETED | OUTPATIENT
Start: 2024-10-16 | End: 2024-10-16

## 2024-10-16 RX ADMIN — HALOPERIDOL DECANOATE 150 MG: 100 INJECTION INTRAMUSCULAR at 11:26

## 2024-10-16 RX ADMIN — LITHIUM CARBONATE 300 MG: 300 CAPSULE, GELATIN COATED ORAL at 11:26

## 2024-10-16 RX ADMIN — LITHIUM CARBONATE 300 MG: 300 CAPSULE, GELATIN COATED ORAL at 17:01

## 2024-10-16 RX ADMIN — HALOPERIDOL 10 MG: 5 TABLET ORAL at 21:09

## 2024-10-16 RX ADMIN — LITHIUM CARBONATE 300 MG: 300 CAPSULE, GELATIN COATED ORAL at 21:12

## 2024-10-16 RX ADMIN — OLANZAPINE 20 MG: 5 TABLET, FILM COATED ORAL at 21:08

## 2024-10-16 RX ADMIN — ATORVASTATIN CALCIUM 40 MG: 20 TABLET, FILM COATED ORAL at 21:10

## 2024-10-16 RX ADMIN — PANTOPRAZOLE SODIUM 40 MG: 40 TABLET, DELAYED RELEASE ORAL at 11:32

## 2024-10-16 RX ADMIN — Medication 1 TABLET: at 11:26

## 2024-10-16 SDOH — HEALTH STABILITY: MENTAL HEALTH: FOR HIGH RISK PATIENTS: ALL INTERVENTIONS ABOVE, PLUS:;1:1 PATIENT OBSERVER AT ALL TIMES

## 2024-10-16 SDOH — HEALTH STABILITY: MENTAL HEALTH: BEHAVIORS/MOOD: COOPERATIVE;DELUSIONS

## 2024-10-16 SDOH — HEALTH STABILITY: MENTAL HEALTH: DELUSIONS: PARANOID

## 2024-10-16 SDOH — HEALTH STABILITY: MENTAL HEALTH: BEHAVIORAL HEALTH(WDL): EXCEPTIONS TO WDL

## 2024-10-16 SDOH — HEALTH STABILITY: MENTAL HEALTH: CONTENT: DELUSIONS
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
## 2024-10-16 NOTE — PROGRESS NOTES
"Observation Progress Note  [facility] EMERGENCY MEDICINE           Guadalupe Lloyd is a 54 y.o. female on day 0 of observation awaiting psychiatric bed availability and admission for increasing auditory hallucinations.        Physical Exam     GENERAL:  The patient appears nourished and normally developed. Vital signs as documented.     PULMONARY:  Without any respiratory distress. Able to speak full sentences, no accessory muscle use    CARDIAC: Warm and well perfused. No cyanosis.    MUSCULOSKELETAL:   Able to ambulate, Non edematous, with no obvious deformities.     SKIN: No pallor. Intact.    NEURO:  No obvious neurological deficits.  Able to follow commands.    Psych: calm. cooperative      Last Recorded Vitals  Blood pressure 128/78, pulse 65, temperature 36.5 °C (97.7 °F), temperature source Temporal, resp. rate 16, height 1.6 m (5' 3\"), weight 112 kg (246 lb), SpO2 97%.  Intake/Output last 3 Shifts:  No intake/output data recorded.    Scheduled medications  atorvastatin, 40 mg, oral, Nightly  haloperidol, 20 mg, oral, BID  lithium, 300 mg, oral, TID  magnesium hydroxide, 30 mL, oral, Once  multivitamin with minerals iron-free, 1 tablet, oral, Daily  OLANZapine, 20 mg, oral, Nightly  pantoprazole, 40 mg, oral, q AM  traZODone, 100 mg, oral, Nightly      Continuous medications     PRN medications  PRN medications: ondansetron ODT    Impression and Plan     Diagnoses as of 10/16/24 0427   Hallucinations   Delusions (Multi)       Guadalupe Lloyd remains under observation status in [facility] EMERGENCY MEDICINE for psychiatric illness monitoring and treatment while awaiting inpatient behavioral health bed availability.     Psychiatric consult service recommendations reviewed, case discussed with them and decision for inpatient hospitalization continues to be necessary.     Patient and Family updated on plan of care.     Arianne Glez MD                    "

## 2024-10-17 PROCEDURE — G0378 HOSPITAL OBSERVATION PER HR: HCPCS

## 2024-10-17 PROCEDURE — 2500000002 HC RX 250 W HCPCS SELF ADMINISTERED DRUGS (ALT 637 FOR MEDICARE OP, ALT 636 FOR OP/ED)

## 2024-10-17 PROCEDURE — 2500000001 HC RX 250 WO HCPCS SELF ADMINISTERED DRUGS (ALT 637 FOR MEDICARE OP)

## 2024-10-17 PROCEDURE — 2500000001 HC RX 250 WO HCPCS SELF ADMINISTERED DRUGS (ALT 637 FOR MEDICARE OP): Performed by: PHYSICIAN ASSISTANT

## 2024-10-17 RX ADMIN — ATORVASTATIN CALCIUM 40 MG: 20 TABLET, FILM COATED ORAL at 20:28

## 2024-10-17 RX ADMIN — LITHIUM CARBONATE 300 MG: 300 CAPSULE, GELATIN COATED ORAL at 20:28

## 2024-10-17 RX ADMIN — HALOPERIDOL 10 MG: 5 TABLET ORAL at 20:28

## 2024-10-17 RX ADMIN — LITHIUM CARBONATE 300 MG: 300 CAPSULE, GELATIN COATED ORAL at 14:39

## 2024-10-17 RX ADMIN — Medication 1 TABLET: at 10:41

## 2024-10-17 RX ADMIN — OLANZAPINE 20 MG: 5 TABLET, FILM COATED ORAL at 20:28

## 2024-10-17 RX ADMIN — LITHIUM CARBONATE 300 MG: 300 CAPSULE, GELATIN COATED ORAL at 10:41

## 2024-10-17 RX ADMIN — HALOPERIDOL 10 MG: 5 TABLET ORAL at 08:50

## 2024-10-17 RX ADMIN — PANTOPRAZOLE SODIUM 40 MG: 40 TABLET, DELAYED RELEASE ORAL at 08:50

## 2024-10-17 ASSESSMENT — PAIN SCALES - GENERAL: PAINLEVEL_OUTOF10: 0 - NO PAIN

## 2024-10-17 NOTE — PROGRESS NOTES
"Guadalupe Lloyd is a 54 y.o. female on day 0 of admission presenting with Auditory hallucination. Accepting facility called ED to request patient's \"Letter of Guardianship\", to be sent prior to transfer. SW obtained [facility] website, and printed. ED staff faxed to facility.   Addendum, 423pm: Facility updated - requesting signed voluntary form by guardian or 2 hospital witnesses. ED Attending, EDOUARD called GuardianGabe, [phone], who answered, and agreed for patient to be transferred to [facility] for further Psychiatric inpatient intervention. EDOUARD updated EPAT and medical team; who will coordinate patient's transfer. No other ED SW needs at this time.  "

## 2024-10-17 NOTE — PROGRESS NOTES
"Guadalupe Lloyd is a 54 y.o. female with a past psychiatric history of schizoaffective disorder, bipolar type and a past medical history of HTN who presents to [facility] ED on 10/14/24 from [facility] nursing [facility] for aggression with staff at the facility and delusions in the setting of medication noncompliance and hallucinations. Psychiatry was consulted on 10/14/24 for evaluation.     -Patient is currently awaiting inpatient psychiatric hospital placement.    Subjective     On interview this morning, pt was sleeping comfortably however was arousable on verbal stimulation. She continues to state she has been cremated and this time by our formal psychiatrist Dr. Serrano from [facility]. She believes she is \"God\" and can \"see through things\". Patient tolerated Haldol Dec 150mg IM well yesterday and she also received her PO medications. She is other wise pleasant. She continues to endorse delusions with disorganized thought process. Denies SI/HI    Objective     Last Recorded Vitals  Blood pressure 146/73, pulse 85, temperature 36.8 °C (98.2 °F), temperature source Tympanic, resp. rate 18, height 1.6 m (5' 3\"), weight 112 kg (246 lb), SpO2 100%.    Review of Systems    Psychiatric ROS - Adult -- Limited to assess due to patient's current mental state    Psychosis: delusions  Joy: negative  Safety Issues: none    Physical Exam    Mental Status Examination  General Appearance: Fairly groomed.wearing a pink head scarf, and glasses, laying comfortably in bed  Speech: Slow speech  Mood: \"ok\"  Affect: Flat  Thought Process: Gross disorganization  Thought Associations: Mild loosening of associations  Thought Content: delusions  Perception: visual hallucinations  Level of Consciousness: drowsy  Orientation: Alert  Insight: limited  Judgment: limited    Psychiatric Risk Assessment  Violence Risk Assessment: major mental illness and unemployment  Acute Risk of Harm to Others is Considered: low   Suicide Risk " "Assessment: chronic medical illness and current psychiatric illness  Protective Factors against Suicide: adherence to  treatment  Acute Risk of Harm to Self is Considered: low  ASSESSMENT AND PLAN:  --Guadalupe Lloyd is a 54 y.o. female with a past psychiatric history of schizoaffective disorder, bipolar type and a past medical history of HTN who presents to [facility] ED on 10/14/24 from [facility] for aggression with staff at the facility and delusions in the setting of medication noncompliance. Psychiatry was consulted on 10/14/24 for evaluation.     On 10/17/2024 assessment, pt is calm and cooperative, but continues to express multiple persecutory delusions and visual hallucinations. She tolerated IM Haldol Dec 150mg well on 10/16/2024 and is compliant on taking PO psychotropic medications while in the ER.  patient continues to meet criteria for inpatient psychiatric hospitalization for her safety, stabilization, and treatment.     IMPRESSION  Schizoaffective disorder, bipolar type     RECOMMENDATIONS  Safety:  - Patient does currently meet criteria for inpatient psychiatric admission. Once patient is deemed medically cleared, please document in note that patient is MEDICALLY CLEARED and contact [facility] for referral at [phone], pager [phone]. Issue Application for Emergency Admission (pink slip) only after patient is accepted to an inpatient psychiatric unit and is ready to be discharged. Search \"Application for Emergency Admission\" under SmartText.     - Patient lacks the capacity to leave AMA at this time and thus cannot leave AMA. Call [facility] if patient attempts to leave AMA. She has a legal guardian     - Continue 1:1 sitter from a psychiatric perspective at this time.     MEDICATIONS:  --According to the most recent discharge summary, the patient is recommended to receive Haldol Dec 150 mg IM every two weeks. The last dose was administered on 09/18/2024, but the patient refused the injection " scheduled for 10/02/2024. Received Haldol Dec 150 mg IM into the gluteal muscle today (10/16/2024), next injection of Haldol Dec 150mg due on 10/30/2024.      Continue the following medications as listed:  -- Haldol 20mg to 10mg BID PO  --Lithium 300mg TID  --Zyprexa 20mg PO at bedtime       ==========  - Discussed recommendations with primary team.     --Patient was discussed with Attending Psychiatrist Dr. Aminta Hirsch MD  PGY-5 CL Psychiatry Fellow

## 2024-10-18 ENCOUNTER — HOSPITAL ENCOUNTER (INPATIENT)
Facility: HOSPITAL | Age: 54
DRG: 885 | End: 2024-10-18
Attending: PSYCHIATRY & NEUROLOGY | Admitting: PSYCHIATRY & NEUROLOGY
Payer: COMMERCIAL

## 2024-10-18 VITALS
SYSTOLIC BLOOD PRESSURE: 134 MMHG | BODY MASS INDEX: 43.59 KG/M2 | DIASTOLIC BLOOD PRESSURE: 69 MMHG | RESPIRATION RATE: 15 BRPM | TEMPERATURE: 97.7 F | OXYGEN SATURATION: 96 % | WEIGHT: 246 LBS | HEART RATE: 70 BPM | HEIGHT: 63 IN

## 2024-10-18 DIAGNOSIS — F25.0 SCHIZOAFFECTIVE DISORDER, BIPOLAR TYPE (MULTI): ICD-10-CM

## 2024-10-18 PROBLEM — F25.9 SCHIZOAFFECTIVE DISORDER (MULTI): Status: ACTIVE | Noted: 2024-09-16

## 2024-10-18 PROBLEM — K21.9 GASTROESOPHAGEAL REFLUX DISEASE WITHOUT ESOPHAGITIS: Status: ACTIVE | Noted: 2024-10-18

## 2024-10-18 PROBLEM — E78.2 MIXED HYPERLIPIDEMIA: Status: ACTIVE | Noted: 2024-10-18

## 2024-10-18 LAB
CHOLEST SERPL-MCNC: 138 MG/DL (ref 0–199)
CHOLESTEROL/HDL RATIO: 3.8
HDLC SERPL-MCNC: 36.1 MG/DL
LDLC SERPL CALC-MCNC: 74 MG/DL
NON HDL CHOLESTEROL: 102 MG/DL (ref 0–149)
TRIGL SERPL-MCNC: 140 MG/DL (ref 0–149)
TSH SERPL-ACNC: 0.56 MIU/L (ref 0.44–3.98)
VLDL: 28 MG/DL (ref 0–40)

## 2024-10-18 PROCEDURE — 2500000001 HC RX 250 WO HCPCS SELF ADMINISTERED DRUGS (ALT 637 FOR MEDICARE OP)

## 2024-10-18 PROCEDURE — 1140000001 HC PRIVATE PSYCH ROOM DAILY

## 2024-10-18 PROCEDURE — 2500000001 HC RX 250 WO HCPCS SELF ADMINISTERED DRUGS (ALT 637 FOR MEDICARE OP): Performed by: PHYSICIAN ASSISTANT

## 2024-10-18 PROCEDURE — 2500000001 HC RX 250 WO HCPCS SELF ADMINISTERED DRUGS (ALT 637 FOR MEDICARE OP): Performed by: PSYCHIATRY & NEUROLOGY

## 2024-10-18 PROCEDURE — 99221 1ST HOSP IP/OBS SF/LOW 40: CPT | Performed by: INTERNAL MEDICINE

## 2024-10-18 PROCEDURE — 99222 1ST HOSP IP/OBS MODERATE 55: CPT | Performed by: PSYCHIATRY & NEUROLOGY

## 2024-10-18 PROCEDURE — 2500000001 HC RX 250 WO HCPCS SELF ADMINISTERED DRUGS (ALT 637 FOR MEDICARE OP): Performed by: INTERNAL MEDICINE

## 2024-10-18 PROCEDURE — G0378 HOSPITAL OBSERVATION PER HR: HCPCS

## 2024-10-18 PROCEDURE — 2500000002 HC RX 250 W HCPCS SELF ADMINISTERED DRUGS (ALT 637 FOR MEDICARE OP, ALT 636 FOR OP/ED): Performed by: PSYCHIATRY & NEUROLOGY

## 2024-10-18 RX ORDER — LORAZEPAM 2 MG/ML
2 INJECTION INTRAMUSCULAR EVERY 6 HOURS PRN
Status: DISCONTINUED | OUTPATIENT
Start: 2024-10-18 | End: 2024-10-23 | Stop reason: HOSPADM

## 2024-10-18 RX ORDER — ADHESIVE BANDAGE
30 BANDAGE TOPICAL DAILY PRN
Status: DISCONTINUED | OUTPATIENT
Start: 2024-10-18 | End: 2024-10-23 | Stop reason: HOSPADM

## 2024-10-18 RX ORDER — ATORVASTATIN CALCIUM 40 MG/1
40 TABLET, FILM COATED ORAL NIGHTLY
Status: DISCONTINUED | OUTPATIENT
Start: 2024-10-18 | End: 2024-10-23 | Stop reason: HOSPADM

## 2024-10-18 RX ORDER — OLANZAPINE 10 MG/1
10 TABLET ORAL EVERY 6 HOURS PRN
Status: DISCONTINUED | OUTPATIENT
Start: 2024-10-18 | End: 2024-10-23 | Stop reason: HOSPADM

## 2024-10-18 RX ORDER — LORAZEPAM 1 MG/1
2 TABLET ORAL EVERY 6 HOURS PRN
Status: DISCONTINUED | OUTPATIENT
Start: 2024-10-18 | End: 2024-10-23 | Stop reason: HOSPADM

## 2024-10-18 RX ORDER — PANTOPRAZOLE SODIUM 40 MG/1
40 TABLET, DELAYED RELEASE ORAL
Status: DISCONTINUED | OUTPATIENT
Start: 2024-10-19 | End: 2024-10-23 | Stop reason: HOSPADM

## 2024-10-18 RX ORDER — LITHIUM CARBONATE 150 MG/1
300 CAPSULE ORAL
Status: DISCONTINUED | OUTPATIENT
Start: 2024-10-18 | End: 2024-10-23 | Stop reason: HOSPADM

## 2024-10-18 RX ORDER — HALOPERIDOL 5 MG/1
10 TABLET ORAL 2 TIMES DAILY
Status: DISCONTINUED | OUTPATIENT
Start: 2024-10-18 | End: 2024-10-23 | Stop reason: HOSPADM

## 2024-10-18 RX ORDER — OLANZAPINE 10 MG/1
20 TABLET ORAL NIGHTLY
Status: DISCONTINUED | OUTPATIENT
Start: 2024-10-18 | End: 2024-10-23 | Stop reason: HOSPADM

## 2024-10-18 RX ORDER — HALOPERIDOL DECANOATE 100 MG/ML
150 INJECTION INTRAMUSCULAR
Status: DISCONTINUED | OUTPATIENT
Start: 2024-10-30 | End: 2024-10-23 | Stop reason: HOSPADM

## 2024-10-18 RX ORDER — BENZTROPINE MESYLATE 0.5 MG/1
0.5 TABLET ORAL 2 TIMES DAILY
Status: DISCONTINUED | OUTPATIENT
Start: 2024-10-18 | End: 2024-10-23 | Stop reason: HOSPADM

## 2024-10-18 RX ORDER — OLANZAPINE 5 MG/1
5 TABLET ORAL EVERY 6 HOURS PRN
Status: DISCONTINUED | OUTPATIENT
Start: 2024-10-18 | End: 2024-10-23 | Stop reason: HOSPADM

## 2024-10-18 RX ORDER — OLANZAPINE 10 MG/2ML
5 INJECTION, POWDER, FOR SOLUTION INTRAMUSCULAR EVERY 6 HOURS PRN
Status: DISCONTINUED | OUTPATIENT
Start: 2024-10-18 | End: 2024-10-23 | Stop reason: HOSPADM

## 2024-10-18 RX ORDER — OLANZAPINE 10 MG/2ML
10 INJECTION, POWDER, FOR SOLUTION INTRAMUSCULAR EVERY 6 HOURS PRN
Status: DISCONTINUED | OUTPATIENT
Start: 2024-10-18 | End: 2024-10-23 | Stop reason: HOSPADM

## 2024-10-18 RX ADMIN — HALOPERIDOL 10 MG: 5 TABLET ORAL at 08:14

## 2024-10-18 RX ADMIN — LITHIUM CARBONATE 300 MG: 300 CAPSULE, GELATIN COATED ORAL at 08:14

## 2024-10-18 SDOH — ECONOMIC STABILITY: FOOD INSECURITY
HOW HARD IS IT FOR YOU TO PAY FOR THE VERY BASICS LIKE FOOD, HOUSING, MEDICAL CARE, AND HEATING?: PATIENT UNABLE TO ANSWER

## 2024-10-18 SDOH — HEALTH STABILITY: MENTAL HEALTH: HALLUCINATION TYPE: AUDITORY

## 2024-10-18 SDOH — SOCIAL STABILITY: SOCIAL NETWORK
DO YOU BELONG TO ANY CLUBS OR ORGANIZATIONS SUCH AS CHURCH GROUPS, UNIONS, FRATERNAL OR ATHLETIC GROUPS, OR SCHOOL GROUPS?: PATIENT DECLINED

## 2024-10-18 SDOH — SOCIAL STABILITY: SOCIAL INSECURITY: ABUSE: ADULT

## 2024-10-18 SDOH — SOCIAL STABILITY: SOCIAL INSECURITY: WITHIN THE LAST YEAR, HAVE YOU BEEN AFRAID OF YOUR PARTNER OR EX-PARTNER?: NO

## 2024-10-18 SDOH — SOCIAL STABILITY: SOCIAL INSECURITY: FEELS SAFE LIVING IN HOME: NO

## 2024-10-18 SDOH — HEALTH STABILITY: PHYSICAL HEALTH: ON AVERAGE, HOW MANY DAYS PER WEEK DO YOU ENGAGE IN MODERATE TO STRENUOUS EXERCISE (LIKE A BRISK WALK)?: 0 DAYS

## 2024-10-18 SDOH — HEALTH STABILITY: MENTAL HEALTH: EXPERIENCED ANY OF THE FOLLOWING LIFE EVENTS: OTHER (COMMENT)

## 2024-10-18 SDOH — HEALTH STABILITY: MENTAL HEALTH
DO YOU FEEL STRESS - TENSE, RESTLESS, NERVOUS, OR ANXIOUS, OR UNABLE TO SLEEP AT NIGHT BECAUSE YOUR MIND IS TROUBLED ALL THE TIME - THESE DAYS?: NOT AT ALL

## 2024-10-18 SDOH — SOCIAL STABILITY: SOCIAL INSECURITY: HAVE YOU HAD THOUGHTS OF HARMING ANYONE ELSE?: NO

## 2024-10-18 SDOH — SOCIAL STABILITY: SOCIAL NETWORK: HOW OFTEN DO YOU ATTEND MEETINGS OF THE CLUBS OR ORGANIZATIONS YOU BELONG TO?: PATIENT DECLINED

## 2024-10-18 SDOH — SOCIAL STABILITY: SOCIAL INSECURITY: HAVE YOU HAD ANY THOUGHTS OF HARMING ANYONE ELSE?: NO

## 2024-10-18 SDOH — HEALTH STABILITY: MENTAL HEALTH: DEPRESSION SYMPTOMS: IMPAIRED CONCENTRATION

## 2024-10-18 SDOH — SOCIAL STABILITY: SOCIAL INSECURITY: ARE YOU MARRIED, WIDOWED, DIVORCED, SEPARATED, NEVER MARRIED, OR LIVING WITH A PARTNER?: PATIENT DECLINED

## 2024-10-18 SDOH — SOCIAL STABILITY: SOCIAL INSECURITY
WITHIN THE LAST YEAR, HAVE YOU BEEN RAPED OR FORCED TO HAVE ANY KIND OF SEXUAL ACTIVITY BY YOUR PARTNER OR EX-PARTNER?: NO

## 2024-10-18 SDOH — ECONOMIC STABILITY: FOOD INSECURITY
WITHIN THE PAST 12 MONTHS, YOU WORRIED THAT YOUR FOOD WOULD RUN OUT BEFORE YOU GOT THE MONEY TO BUY MORE.: PATIENT DECLINED

## 2024-10-18 SDOH — SOCIAL STABILITY: SOCIAL INSECURITY: WITHIN THE LAST YEAR, HAVE YOU BEEN HUMILIATED OR EMOTIONALLY ABUSED IN OTHER WAYS BY YOUR PARTNER OR EX-PARTNER?: NO

## 2024-10-18 SDOH — SOCIAL STABILITY: SOCIAL INSECURITY: ARE THERE ANY APPARENT SIGNS OF INJURIES/BEHAVIORS THAT COULD BE RELATED TO ABUSE/NEGLECT?: NO

## 2024-10-18 SDOH — SOCIAL STABILITY: SOCIAL INSECURITY: DOES ANYONE TRY TO KEEP YOU FROM HAVING/CONTACTING OTHER FRIENDS OR DOING THINGS OUTSIDE YOUR HOME?: NO

## 2024-10-18 SDOH — SOCIAL STABILITY: SOCIAL NETWORK: HOW OFTEN DO YOU ATTEND CHURCH OR RELIGIOUS SERVICES?: PATIENT DECLINED

## 2024-10-18 SDOH — HEALTH STABILITY: MENTAL HEALTH: ANXIETY SYMPTOMS: NO PROBLEMS REPORTED OR OBSERVED.

## 2024-10-18 SDOH — ECONOMIC STABILITY: HOUSING INSECURITY: AT ANY TIME IN THE PAST 12 MONTHS, WERE YOU HOMELESS OR LIVING IN A SHELTER (INCLUDING NOW)?: NO

## 2024-10-18 SDOH — SOCIAL STABILITY: SOCIAL INSECURITY
WITHIN THE LAST YEAR, HAVE YOU BEEN KICKED, HIT, SLAPPED, OR OTHERWISE PHYSICALLY HURT BY YOUR PARTNER OR EX-PARTNER?: NO

## 2024-10-18 SDOH — SOCIAL STABILITY: SOCIAL INSECURITY: WERE YOU ABLE TO COMPLETE ALL THE BEHAVIORAL HEALTH SCREENINGS?: YES

## 2024-10-18 SDOH — HEALTH STABILITY: MENTAL HEALTH: MENTAL HEALTH TREATMENT: MEDICATION

## 2024-10-18 SDOH — SOCIAL STABILITY: SOCIAL NETWORK: IN A TYPICAL WEEK, HOW MANY TIMES DO YOU TALK ON THE PHONE WITH FAMILY, FRIENDS, OR NEIGHBORS?: PATIENT DECLINED

## 2024-10-18 SDOH — ECONOMIC STABILITY: TRANSPORTATION INSECURITY: IN THE PAST 12 MONTHS, HAS LACK OF TRANSPORTATION KEPT YOU FROM MEDICAL APPOINTMENTS OR FROM GETTING MEDICATIONS?: NO

## 2024-10-18 SDOH — HEALTH STABILITY: MENTAL HEALTH: PREVIOUS MENTAL HEALTH TREATMENT: MEDICATION;OUTPATIENT TREATMENT;INPATIENT TREATMENT

## 2024-10-18 SDOH — ECONOMIC STABILITY: HOUSING INSECURITY
IN THE LAST 12 MONTHS, WAS THERE A TIME WHEN YOU WERE NOT ABLE TO PAY THE MORTGAGE OR RENT ON TIME?: PATIENT UNABLE TO ANSWER

## 2024-10-18 SDOH — HEALTH STABILITY: MENTAL HEALTH: MENTAL HEALTH CONDITIONS/ SYMPTOMS: HALLUCINATIONS;THOUGHT PROCESS, ALTERED

## 2024-10-18 SDOH — ECONOMIC STABILITY: FOOD INSECURITY: WITHIN THE PAST 12 MONTHS, THE FOOD YOU BOUGHT JUST DIDN'T LAST AND YOU DIDN'T HAVE MONEY TO GET MORE.: PATIENT DECLINED

## 2024-10-18 SDOH — HEALTH STABILITY: PHYSICAL HEALTH
HOW OFTEN DO YOU NEED TO HAVE SOMEONE HELP YOU WHEN YOU READ INSTRUCTIONS, PAMPHLETS, OR OTHER WRITTEN MATERIAL FROM YOUR DOCTOR OR PHARMACY?: NEVER

## 2024-10-18 SDOH — SOCIAL STABILITY: SOCIAL INSECURITY: ARE YOU OR HAVE YOU BEEN THREATENED OR ABUSED PHYSICALLY, EMOTIONALLY, OR SEXUALLY BY ANYONE?: NO

## 2024-10-18 SDOH — HEALTH STABILITY: PHYSICAL HEALTH: ON AVERAGE, HOW MANY MINUTES DO YOU ENGAGE IN EXERCISE AT THIS LEVEL?: 0 MIN

## 2024-10-18 SDOH — SOCIAL STABILITY: SOCIAL NETWORK: HOW OFTEN DO YOU GET TOGETHER WITH FRIENDS OR RELATIVES?: PATIENT DECLINED

## 2024-10-18 SDOH — ECONOMIC STABILITY: HOUSING INSECURITY: FEELS SAFE LIVING IN HOME: NO

## 2024-10-18 SDOH — SOCIAL STABILITY: SOCIAL INSECURITY: HAS ANYONE EVER THREATENED TO HURT YOUR FAMILY OR YOUR PETS?: NO

## 2024-10-18 SDOH — SOCIAL STABILITY: SOCIAL INSECURITY: DO YOU FEEL UNSAFE GOING BACK TO THE PLACE WHERE YOU ARE LIVING?: YES

## 2024-10-18 SDOH — SOCIAL STABILITY: SOCIAL INSECURITY: DO YOU FEEL ANYONE HAS EXPLOITED OR TAKEN ADVANTAGE OF YOU FINANCIALLY OR OF YOUR PERSONAL PROPERTY?: YES

## 2024-10-18 SDOH — ECONOMIC STABILITY: HOUSING INSECURITY: IN THE PAST 12 MONTHS, HOW MANY TIMES HAVE YOU MOVED WHERE YOU WERE LIVING?: 1

## 2024-10-18 SDOH — ECONOMIC STABILITY: INCOME INSECURITY
IN THE PAST 12 MONTHS HAS THE ELECTRIC, GAS, OIL, OR WATER COMPANY THREATENED TO SHUT OFF SERVICES IN YOUR HOME?: PATIENT DECLINED

## 2024-10-18 ASSESSMENT — COLUMBIA-SUICIDE SEVERITY RATING SCALE - C-SSRS
6. HAVE YOU EVER DONE ANYTHING, STARTED TO DO ANYTHING, OR PREPARED TO DO ANYTHING TO END YOUR LIFE?: NO
1. SINCE LAST CONTACT, HAVE YOU WISHED YOU WERE DEAD OR WISHED YOU COULD GO TO SLEEP AND NOT WAKE UP?: NO
2. HAVE YOU ACTUALLY HAD ANY THOUGHTS OF KILLING YOURSELF?: NO
5. HAVE YOU STARTED TO WORK OUT OR WORKED OUT THE DETAILS OF HOW TO KILL YOURSELF? DO YOU INTEND TO CARRY OUT THIS PLAN?: YES
2. HAVE YOU ACTUALLY HAD ANY THOUGHTS OF KILLING YOURSELF?: YES
1. SINCE LAST CONTACT, HAVE YOU WISHED YOU WERE DEAD OR WISHED YOU COULD GO TO SLEEP AND NOT WAKE UP?: YES
6. HAVE YOU EVER DONE ANYTHING, STARTED TO DO ANYTHING, OR PREPARED TO DO ANYTHING TO END YOUR LIFE?: YES

## 2024-10-18 ASSESSMENT — LIFESTYLE VARIABLES
HEADACHE, FULLNESS IN HEAD: NOT PRESENT
HOW OFTEN DO YOU HAVE 6 OR MORE DRINKS ON ONE OCCASION: NEVER
VISUAL DISTURBANCES: NOT PRESENT
TREMOR: NO TREMOR
AGITATION: NORMAL ACTIVITY
PRESCIPTION_ABUSE_PAST_12_MONTHS: NO
BLOOD PRESSURE: 135/77
HOW OFTEN DO YOU HAVE A DRINK CONTAINING ALCOHOL: MONTHLY OR LESS
VISUAL DISTURBANCES: NOT PRESENT
ANXIETY: NO ANXIETY, AT EASE
ORIENTATION AND CLOUDING OF SENSORIUM: ORIENTED AND CAN DO SERIAL ADDITIONS
ANXIETY: NO ANXIETY, AT EASE
AUDIT-C TOTAL SCORE: 1
AUDIT-C TOTAL SCORE: 1
NAUSEA AND VOMITING: NO NAUSEA AND NO VOMITING
TOTAL_SCORE: 1
AUDITORY DISTURBANCES: NOT PRESENT
NAUSEA AND VOMITING: NO NAUSEA AND NO VOMITING
HOW MANY STANDARD DRINKS CONTAINING ALCOHOL DO YOU HAVE ON A TYPICAL DAY: PATIENT DOES NOT DRINK
AGITATION: NORMAL ACTIVITY
PAROXYSMAL SWEATS: NO SWEAT VISIBLE
SUBSTANCE_ABUSE_PAST_12_MONTHS: NO
PAROXYSMAL SWEATS: NO SWEAT VISIBLE
TREMOR: NO TREMOR
SKIP TO QUESTIONS 9-10: 1
CIWA TOTAL SCORE: 0
AUDITORY DISTURBANCES: NOT PRESENT
PULSE: 83
HEADACHE, FULLNESS IN HEAD: NOT PRESENT

## 2024-10-18 ASSESSMENT — ACTIVITIES OF DAILY LIVING (ADL)
ADEQUATE_TO_COMPLETE_ADL: YES
HEARING - LEFT EAR: FUNCTIONAL
DRESSING YOURSELF: INDEPENDENT
GROOMING: INDEPENDENT
FEEDING YOURSELF: INDEPENDENT
BATHING: INDEPENDENT
HEARING - RIGHT EAR: FUNCTIONAL
TOILETING: INDEPENDENT
PATIENT'S MEMORY ADEQUATE TO SAFELY COMPLETE DAILY ACTIVITIES?: YES
JUDGMENT_ADEQUATE_SAFELY_COMPLETE_DAILY_ACTIVITIES: YES
WALKS IN HOME: INDEPENDENT
BATHING: NO ASSISTANCE
ASSISTIVE_DEVICE: EYEGLASSES
LACK_OF_TRANSPORTATION: PATIENT DECLINED

## 2024-10-18 ASSESSMENT — PATIENT HEALTH QUESTIONNAIRE - PHQ9
1. LITTLE INTEREST OR PLEASURE IN DOING THINGS: NOT AT ALL
2. FEELING DOWN, DEPRESSED OR HOPELESS: SEVERAL DAYS
SUM OF ALL RESPONSES TO PHQ9 QUESTIONS 1 & 2: 1

## 2024-10-18 ASSESSMENT — PAIN SCALES - GENERAL
PAINLEVEL_OUTOF10: 0 - NO PAIN

## 2024-10-18 ASSESSMENT — PAIN - FUNCTIONAL ASSESSMENT
PAIN_FUNCTIONAL_ASSESSMENT: 0-10
PAIN_FUNCTIONAL_ASSESSMENT: 0-10

## 2024-10-18 NOTE — NURSING NOTE
"Nurse Admission Note    Reason for admission in patient's own words:  \"I am Jahovah. God. I am Holiness.\"    Patient living arrangements prior to admission:  [facility]     Legal status:  voluntarily    Medical consult notification to:  Dr. Arora     Admission folder given to:   Patient     Problems/treatment plans:  Aggression, delusions, hallucinations    Patient requests family to be notified of admission?    Patient has a guardian (Gabe Linda: [phone])  Person notified:  Unable to contact guardian. Voicemail box has not been set up. Will continue to reach out to guardian.     Strengths:  attempting to realize potential    Liabilities:  impaired cognition, lack of social/family support, noncompliant with medication, resistance to treatment    Previous mental health treatment:  Multiple inpatient admissions    Preliminary discharge planning needs:  Unknown  "

## 2024-10-18 NOTE — DISCHARGE SUMMARY
"Observation Disposition Note  [facility] EMERGENCY MEDIC[facility]             Subjective:       Guadalupe Lloyd has been under observation status in [facility] EMERG[facility]IC[facility] for psychiatric illness monitoring and treatment while awaiting inpatient behavioral health bed availability.       Physical Exam     Visit Vitals  /68   Pulse 81   Temp 36.4 °C (97.6 °F) (Temporal)   Resp 16   Ht 1.6 m (5' 3\")   Wt 112 kg (246 lb)   SpO2 95%   BMI 43.58 kg/m²   Smoking Status Never   BSA 2.23 m²       GENERAL:  The patient appears nourished and normally developed. Vital signs as documented.     PULMONARY:  Without any respiratory distress. Able to speak full sentences, no accessory muscle use    CARDIAC: Warm and well perfused. No cyanosis.    MUSCULOSKELETAL:   Able to ambulate, Non edematous, with no obvious deformities.     SKIN: No pallor. Intact.    NEURO:  No obvious neurological deficits.  Able to follow commands.    Psych: calm      Impresssion and Plan     Diagnoses as of 10/18/24 0714   Hallucinations   Delusions (Multi)       In summary, Guadalupe Lloyd has been cared under observation in [facility] for psychiatric illness monitoring and treatment while awaiting inpatient behavioral health bed availability.     Emergency Psychiatric Assessment Team was consulted. Case discussed with them and decision for inpatient hospitalization deemed necessary.     Patient has been accepted at  [facility]    Total length of observation was 78 hours. Dr. Katt Wilson MD;M* is the disposition attending.    Discharge Diagnosis  Auditory hallucination    Arianne Glez MD    "

## 2024-10-18 NOTE — GROUP NOTE
Group Topic: Other   Group Date: 10/18/2024  Start Time: 1100  End Time: 1200  Facilitators: SHER Frias   Department: [facility] REHABTH VIRTUAL    Number of Participants: 6   Group Focus: Dice Breakers- feeling awareness/expression, goals, self-awareness, self-esteem, and social skills  Treatment Modality: Leisure Development  Interventions utilized were exploration, group exercise, leisure development, reminiscence, and support  Purpose: feelings, insight or knowledge, and self-worth, increase attention span, increase socialization, stimulate memory, increase activity level, enhance self-esteem    Name: April Gabino YOB: 1970   MR: [id number]      Facilitator: Recreational Therapist  Level of Participation: active  Quality of Participation: appropriate/pleasant, attentive, cooperative, and engaged  Interactions with others: appropriate  Mood/Affect: appropriate and brightens with interaction  Cognition: coherent/clear  Progress: Minimal  Comments: pt problem is delusional thought. Pt joins group late secondary to admission. Displays appropriate behaviors and good attention to task.   Plan: continue with services

## 2024-10-18 NOTE — CONSULTS
[facility]: [facility]  CONSULT NOTE      Guadalupe Lloyd is a 54 y.o. female that is being seen  today for medical management at [facility]..  Subjective   Patient is being seen for medical management [facility].This is a 54-year-old female with a past medical history of schizoaffective disorder, anxiety, GERD, depression, hypertension who presents to the emergency department from [facility] for psychiatric evaluation.  According to EMS she was being aggressive with staff at the facility and therefore they called to have her evaluated.  Patient was evaluated in the ER and recommended [facility] admission.  Patient was medically cleared.  Patient lab work reviewed and has been stable.  Patient's lithium level was low.        ROS  Negative for fever or chills  Negative for sore throat, ear pain, nasal discharge  Negative for cough, shortness of breath or wheezing  Negative for chest pain, palpitations, swelling of legs  Negative for abdominal pain, constipation, diarrhea, blood in the stools  Negative for urinary complaints  Negative for headache, dizziness, weakness or numbness  Negative for joint pain  Positive for anxiety  All other systems reviewed and were negative   Vitals:    10/18/24 1105   BP:    Pulse: 83   Resp:    Temp:    SpO2:       Vitals:    10/18/24 1010   Weight: 115 kg (252 lb 6.8 oz)     Body mass index is 44.72 kg/m².  Physical Exam  Constitutional: Patient does not appear to be in any acute distress  Head and Face: NCAT  Eyes: Normal external exam, EOMI  ENT: Normal external inspection of ears and nose. Oropharynx normal.  Cardiovascular: RRR, S1/S2, no murmurs, rubs, or gallops, radial pulses +2, no edema of extremities  Pulmonary: CTAB, no respiratory distress.  Abdomen: +BS, soft, non-tender, nondistended, no guarding or rebound, no masses noted  MSK: No joint swelling, normal movements of all extremities. Range of motion- normal.  Skin- No lesions,  "contusions, or erythema.  Peripheral puslses palpable bilaterally 2+  Neuro: AAO X3, Cranial nerves 2-12 grossly intact,DTR 2+ in all 4 limbs       LABS   [unfilled]  Lab Results   Component Value Date    GLUCOSE 128 (H) 10/14/2024    CALCIUM 9.6 10/14/2024     10/14/2024    K 4.3 10/14/2024    CO2 22 10/14/2024     10/14/2024    BUN 16 10/14/2024    CREATININE 0.96 10/14/2024     Lab Results   Component Value Date    ALT 20 10/14/2024    AST 20 10/14/2024    ALKPHOS 72 10/14/2024    BILITOT 0.3 10/14/2024     Lab Results   Component Value Date    WBC 7.2 10/14/2024    HGB 11.7 (L) 10/14/2024    HCT 37.3 10/14/2024    MCV 96 10/14/2024     10/14/2024     Lab Results   Component Value Date    CHOL 157 11/04/2020    CHOL 164 04/18/2019     Lab Results   Component Value Date    HDL 43.9 11/04/2020    HDL 50.4 04/18/2019     No results found for: \"LDLCALC\"  Lab Results   Component Value Date    TRIG 97 11/04/2020     Lab Results   Component Value Date    HGBA1C 5.7 (H) 09/16/2024     Other labs not included in the list above were reviewed either before or during this encounter.    History    History reviewed. No pertinent past medical history.  No past surgical history on file.  No family history on file.  Allergies   Allergen Reactions    Penicillin G Anaphylaxis    [place] Unknown    Clozapine Unknown    [place] Unknown    Peanut Unknown    Sunflower Seed Unknown     Reported by patient with limited additional information beyond the presence of the allergy     Current Facility-Administered Medications on File Prior to Encounter   Medication Dose Route Frequency Provider Last Rate Last Admin    [DISCONTINUED] atorvastatin (Lipitor) tablet 40 mg  40 mg oral Nightly Dorothea Hayes MD   40 mg at 10/17/24 2028    [DISCONTINUED] haloperidol (Haldol) tablet 10 mg  10 mg oral BID Karen Renee PA-C   10 mg at 10/18/24 0814    [DISCONTINUED] lithium capsule 300 mg  300 mg oral TID Dorothea Hayes MD   " 300 mg at 10/18/24 0814    [DISCONTINUED] magnesium hydroxide (Milk of Magnesia) 400 mg/5 mL suspension 30 mL  30 mL oral Once Dorothea Hayes MD        [DISCONTINUED] multivitamin with minerals iron-free (Centrum Silver) tablet 1 tablet  1 tablet oral Daily Dorothea Hayes MD   1 tablet at 10/17/24 1041    [DISCONTINUED] OLANZapine (ZyPREXA) tablet 20 mg  20 mg oral Nightly Dorothea Hayes MD   20 mg at 10/17/24 2028    [DISCONTINUED] ondansetron ODT (Zofran-ODT) disintegrating tablet 4 mg  4 mg oral q8h PRN Gely Small MD   4 mg at 10/15/24 0919    [DISCONTINUED] pantoprazole (ProtoNix) EC tablet 40 mg  40 mg oral q AM Dorothea Hayes MD   40 mg at 10/17/24 0850     Current Outpatient Medications on File Prior to Encounter   Medication Sig Dispense Refill    atorvastatin (Lipitor) 40 mg tablet Take 1 tablet (40 mg) by mouth once daily at bedtime.      haloperidol (Haldol) 20 mg tablet Take 1 tablet (20 mg) by mouth 2 times a day.      haloperidol decanoate (Haldol Decanoate) 100 mg/mL injection Inject 1.5 mL (150 mg) into the muscle every 14 (fourteen) days.      lithium 300 mg capsule Take 1 capsule (300 mg) by mouth 3 times daily (morning, midday, late afternoon).      multivitamin with minerals tablet Take 1 tablet by mouth once daily.      OLANZapine (ZyPREXA) 20 mg tablet Take 1 tablet (20 mg) by mouth once daily at bedtime.      pantoprazole (ProtoNix) 40 mg EC tablet Take 1 tablet (40 mg) by mouth once daily in the morning. Take before meals. Do not crush, chew, or split.      magnesium hydroxide (Milk of Magnesia) 400 mg/5 mL suspension Take 30 mL by mouth once daily as needed for constipation.      sodium phosphates (Fleet Enema) 19-7 gram/118 mL enema enema Insert 133 mL (1 enema) into the rectum if needed for constipation.      traZODone (Desyrel) 100 mg tablet Take 1 tablet (100 mg) by mouth once daily at bedtime.      [DISCONTINUED] DULCOLAX, BISACODYL, RECT Insert 1 suppository  into the rectum once daily as needed.       There is no immunization history for the selected administration types on file for this patient.  Patient's medical history was reviewed and updated either before or during this encounter.  ASSESSMENT / PLAN:  Active Hospital Problems    Schizoaffective disorder, bipolar type (Multi)      Mixed hyperlipidemia      Gastroesophageal reflux disease without esophagitis      *Schizoaffective disorder (Multi)       Patient is being seen for medical management at [facility].  Patient vital signs are stable.  Patient is on statins for hyperlipidemia.  Patient is also on Protonix.  Patient's psych medications will be monitored by [facility] unit.    Rashid Arora MD

## 2024-10-18 NOTE — GROUP NOTE
Group Topic: Other   Group Date: 10/18/2024  Start Time: 1330  End Time: 1430  Facilitators: SHER Frias   Department: [facility] REHABTH VIRTUAL    Number of Participants: 5   Group Focus: Scrabble Slam- concentration, leisure skills, other fine motor skills, following directions, self-esteem, and social skills  Treatment Modality: Leisure Development and Other: Recreation Therapy  Interventions utilized were exploration, group exercise, and leisure development  Purpose: Patients engaged in a therapeutic group game of “Caroline Garland”. Patients take turns creating new four letter words only changing one letter per turn.  This game aims to enhance cognition by ways of concentration and memory stimulation. Also, promotes social stimulation, enhanced mood and following directions.           Name: April Gabino YOB: 1970   MR: [id number]      Facilitator: Recreational Therapist  Level of Participation: did not attend  Progress: None  Comments: pt problem is delusional thought. Pt sound asleep in room. No handout available.   Plan: continue with services
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
## 2024-10-18 NOTE — SIGNIFICANT EVENT
Application for Emergency Admission      Ready for Transfer?  Is the patient medically cleared for transfer to inpatient psychiatry: Yes  Has the patient been accepted to an inpatient psychiatric hospital: Yes    Application for Emergency Admission  IN ACCORDANCE WITH [place] 5122[place]10 [place]  The Chief Clinical Officer of: [facility] 10/17/2024 .10:15 PM    Reason for Hospitalization  The undersigned has reason to believe that: Guadalupe Lloyd Is a mentally ill person subject to hospitalization by court order under division B Section 5122.01 of the Revised Code, i.e., this person:    1.No  Represents a substantial risk of physical harm to self as manifested by evidence of threats of, or attempts at, suicide or serious self-inflicted bodily harm    2.No Represents a substantial risk of physical harm to others as manifested by evidence of recent homicidal or other violent behavior, evidence of recent threats that place another in reasonable fear of violent behavior and serious physical harm, or other evidence of present dangerousness    3.Yes Represents a substantial and immediate risk of serious physical impairment or injury to self as manifested by  evidence that the person is unable to provide for and is not providing for the person's basic physical needs because of the person's mental illness and that appropriate provision for those needs cannot be made  immediately available in the community    4.Yes Would benefit from treatment in a hospital for his mental illness and is in need of such treatment as manifested by evidence of behavior that creates a grave and imminent risk to substantial rights of others or  himself.    5.Yes Would benefit from treatment as manifested by evidence of behavior that indicates all of the following:       (a) The person is unlikely to survive safely in the community without supervision, based on a clinical determination.       (b) The person has a history of lack of compliance with  treatment for mental illness and one of the following applies:      (i) At least twice within the thirty-six months prior to the filing of an affidavit seeking court-ordered treatment of the person under section 5122.111 of the Revised Code, the lack of compliance has been a significant factor in necessitating hospitalization in a hospital or receipt of services in a forensic or other mental health unit of a correctional facility, provided that the thirty-six-month period shall be extended by the length of any hospitalization or incarceration of the person that occurred within the thirty-six-month period.      (ii) Within the forty-eight months prior to the filing of an affidavit seeking court-ordered treatment of the person under section 5122.111 of the Revised Code, the lack of compliance resulted in one or more acts of serious violent behavior toward self or others or threats of, or attempts at, serious physical harm to self or others, provided that the forty-eight-month period shall be extended by the length of any hospitalization or incarceration of the person that occurred within the forty-eight-month period.      (c) The person, as a result of mental illness, is unlikely to voluntarily participate in necessary treatment.       (d) In view of the person's treatment history and current behavior, the person is in need of treatment in order to prevent a relapse or deterioration that would be likely to result in substantial risk of serious harm to the person or others.    (e) Represents a substantial risk of physical harm to self or others if allowed to remain at liberty pending examination.    Therefore, it is requested that said person be admitted to the above named facility.    STATEMENT OF BELIEF    Must be filled out by one of the following: a psychiatrist, licensed physician, licensed clinical psychologist, health or ,  or .  (Statement shall include the circumstances under  which the individual was taken into custody and the reason for the person's belief that hospitalization is necessary. The statement shall also include a reference to efforts made to secure the individual's property at his residence if he was taken into custody there. Every reasonable and appropriate effort should be made to take this person into custody in the least conspicuous manner possible.)    Patient with decompensated schoziaffecctive disorder off of her medications. She is psychotic and having auditory hallucinations. She would benefit from inpatient stabilization.     Katt Wilson MD 10/17/2024     _____________________________________________________________   Place of Employment: [facility]    STATEMENT OF OBSERVATION BY PSYCHIATRIST, LICENSED PHYSICIAN, OR LICENSED CLINICAL PSYCHOLOGIST, IF APPLICABLE    Place of Observation (e.g., [facility] center, general hospital, office, emergency facility)  (If applicable, please complete)    Katt Wilson MD 10/17/2024    _____________________________________________________________

## 2024-10-18 NOTE — DISCHARGE SUMMARY
"Observation Disposition Note  [facility] EMERGENCY MEDICINE             Subjective:       Guadalupe Lloyd has been under observation status in [facility] EMERG[facility] MEDICINE for psychiatric illness monitoring and treatment while awaiting inpatient behavioral health bed availability.       Physical Exam     Visit Vitals  /69 (BP Location: Right arm, Patient Position: Lying)   Pulse 70   Temp 36.5 °C (97.7 °F) (Temporal)   Resp 15   Ht 1.6 m (5' 3\")   Wt 112 kg (246 lb)   SpO2 96%   BMI 43.58 kg/m²   Smoking Status Never   BSA 2.23 m²       GENERAL:  The patient appears nourished and normally developed. Vital signs as documented.     PULMONARY:  Without any respiratory distress. Able to speak full sentences, no accessory muscle use    CARDIAC: Warm and well perfused. No cyanosis.    MUSCULOSKELETAL:   Able to ambulate, Non edematous, with no obvious deformities.     SKIN: No pallor. Intact.    NEURO:  No obvious neurological deficits.  Able to follow commands.    Psych: Delusional, calm, collected.      Impresssion and Plan     Diagnoses as of 10/18/24 0903   Hallucinations   Delusions (Multi)       In summary, Guadalupe Lloyd has been cared under observation in [facility] for psychiatric illness monitoring and treatment while awaiting inpatient behavioral health bed availability.     Emergency Psychiatric Assessment Team was consulted. Case discussed with them and decision for inpatient hospitalization deemed necessary.     Patient has been accepted at  [facility]    Total length of observation was 90 hours. Dr. Katt Wilson MD;H* is the disposition attending.    Discharge Diagnosis  Auditory hallucination    Jasper Chen MD  "
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
## 2024-10-19 PROCEDURE — 1140000001 HC PRIVATE PSYCH ROOM DAILY

## 2024-10-19 PROCEDURE — 99232 SBSQ HOSP IP/OBS MODERATE 35: CPT | Performed by: PSYCHIATRY & NEUROLOGY

## 2024-10-19 PROCEDURE — 2500000001 HC RX 250 WO HCPCS SELF ADMINISTERED DRUGS (ALT 637 FOR MEDICARE OP): Performed by: PSYCHIATRY & NEUROLOGY

## 2024-10-19 PROCEDURE — 2500000002 HC RX 250 W HCPCS SELF ADMINISTERED DRUGS (ALT 637 FOR MEDICARE OP, ALT 636 FOR OP/ED): Performed by: PSYCHIATRY & NEUROLOGY

## 2024-10-19 PROCEDURE — 2500000001 HC RX 250 WO HCPCS SELF ADMINISTERED DRUGS (ALT 637 FOR MEDICARE OP): Performed by: INTERNAL MEDICINE

## 2024-10-19 ASSESSMENT — PAIN - FUNCTIONAL ASSESSMENT: PAIN_FUNCTIONAL_ASSESSMENT: 0-10

## 2024-10-19 ASSESSMENT — COLUMBIA-SUICIDE SEVERITY RATING SCALE - C-SSRS
1. SINCE LAST CONTACT, HAVE YOU WISHED YOU WERE DEAD OR WISHED YOU COULD GO TO SLEEP AND NOT WAKE UP?: NO
1. SINCE LAST CONTACT, HAVE YOU WISHED YOU WERE DEAD OR WISHED YOU COULD GO TO SLEEP AND NOT WAKE UP?: NO
2. HAVE YOU ACTUALLY HAD ANY THOUGHTS OF KILLING YOURSELF?: NO
6. HAVE YOU EVER DONE ANYTHING, STARTED TO DO ANYTHING, OR PREPARED TO DO ANYTHING TO END YOUR LIFE?: NO
2. HAVE YOU ACTUALLY HAD ANY THOUGHTS OF KILLING YOURSELF?: NO
6. HAVE YOU EVER DONE ANYTHING, STARTED TO DO ANYTHING, OR PREPARED TO DO ANYTHING TO END YOUR LIFE?: NO

## 2024-10-19 ASSESSMENT — PAIN SCALES - GENERAL
PAINLEVEL_OUTOF10: 0 - NO PAIN
PAINLEVEL_OUTOF10: 0 - NO PAIN

## 2024-10-19 NOTE — GROUP NOTE
Group Topic: Goals   Group Date: 10/18/2024  Start Time: 2000  End Time: 2014  Facilitators: ALLY Owens   Department: [facility]    Number of Participants: 6   Group Focus: goals  Treatment Modality: Other: pca  Interventions utilized were reminiscence  Purpose: self-worth and self-care    Name: April Tate YOB: 1970   MR: [id number]      Facilitator: [facility]  Level of Participation: did not attend  Quality of Participation:  did not attend  Interactions with others:  did not attend  Mood/Affect:  did not attend  Triggers (if applicable): n/a  Cognition:  did not attend  Progress: Moderate  Comments: pt problem schizoaffective disorder  Plan: continue with services

## 2024-10-19 NOTE — GROUP NOTE
Group Topic: Goals   Group Date: 10/19/2024  Start Time: 0730  End Time: 0800  Facilitators: Paul Byers   Department: [facility]     Number of Participants: 5   Group Focus: goals  Treatment Modality: Patient-Centered Therapy  Interventions utilized were assignment  Purpose: self-care    Name: April Tate YOB: 1970   MR: [id number]    Facilitator: [facility]  Level of Participation: did not attend  Quality of Participation: did not attend  Interactions with others: did not attend  Mood/Affect: did not attend  Triggers (if applicable): did not attend  Cognition: did not attend  Progress: None  Comments: did not attend  Plan: continue with services

## 2024-10-19 NOTE — GROUP NOTE
Group Topic: Art Creative   Group Date: 10/19/2024  Start Time: 1330  End Time: 1430  Facilitators: SHER Evangelista   Department: [facility]    Number of Participants: 8   Group Focus: other Art Group/[employer]ating  Treatment Modality: Other: Recreation Therapy  Interventions utilized were exploration, group exercise, reminiscence, and story telling  Purpose: other: fun, elevate mood, increase socialization    Name: April Gabino YOB: 1970   MR: [id number]      Facilitator: Recreational Therapist  Level of Participation: minimal  Quality of Participation: passive  Interactions with others:  passive  Mood/Affect:  passive  Triggers (if applicable): n/a  Cognition:  passive  Progress: None  Comments: pt problem is delusional thought.  Pt displays mostly passive participation but does provide hand print for group project.  Plan: continue with services

## 2024-10-19 NOTE — GROUP NOTE
"Group Topic: Self-Care/Wellness   Group Date: 10/19/2024  Start Time: 1015  End Time: 1115  Facilitators: SHER Evangelista   Department: [facility] REHABTH VIRTUAL    Number of Participants: 7   Group Focus: other [employer]  Treatment Modality: Other: Recreation Therapy  Interventions utilized were exploration, group exercise, patient education, problem solving, and support  Purpose: coping skills, maladaptive thinking, feelings, irrational fears, communication skills, insight or knowledge, self-worth, self-care, relapse prevention strategies, and trigger / craving management    Name: April Gabino YOB: 1970   MR: [id number]      Facilitator: Recreational Therapist  Level of Participation: minimal  Quality of Participation: appropriate/pleasant and impulsive  Interactions with others:  passive  Mood/Affect: restless  Triggers (if applicable): n/a  Cognition:  passive, unable to assess  Progress: Minimal  Comments: pt problem is delusional thought.  Pt joins group but only stays for about 10 minutes.  At that time, she reports she \"can't stay\" and leaves to go back to her room.   Plan: continue with services      "

## 2024-10-19 NOTE — NURSING NOTE
"ANTON NOTE     Problem:  Delusions     Behavior:  Pt quiet and calm. Pleasant and cooperative with care. Pt is very soft spoken this evening, fair eye contact and minimal interaction. No delusions noted during this interaction. Pt reports that she is feeling \"ok\" and \"just going to get to bed.\"     Group Participation: Did not attend  Appetite/Meals: HS snack provided.        Interventions:  Hs medication administered per MAR    Response:  Medication compliant- no s/s of distress noted.      Plan:  Continue to monitor and assist. Maintain q15 minute rounds for safety.    "
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
## 2024-10-19 NOTE — GROUP NOTE
"Group Topic: Reminiscence   Group Date: 10/19/2024  Start Time: 0945  End Time: 1015  Facilitators: SHER Evangelista   Department: [facility] REHABTH VIRTUAL    Number of Participants: 7   Group Focus: other What's up?  Treatment Modality: Other: Recreation Therapy  Interventions utilized were reminiscence and story telling  Purpose: feelings    Name: April Gabino YOB: 1970   MR: [id number]      Facilitator: Recreational Therapist  Level of Participation: did not attend  Quality of Participation:  did not attend  Interactions with others:  did not attend  Mood/Affect:  did not attend  Triggers (if applicable): n/a  Cognition:  did not attend  Progress: None  Comments: pt problem is delusional thought.  Pt refuses to attend group at this time.  Reports she is \"very tired, maybe later\".  No handout to offer.   Plan: continue with services      "

## 2024-10-19 NOTE — CARE PLAN
The patient's goals for the shift include sleep a bit    The clinical goals for the shift include maintain safety/promote rest    Over the shift, the patient did make progress toward the following goals. Barriers to progression include some confusion from time to time regarding current situation. Recommendations to address these barriers include reorientation as needed.      Problem: Discharge Planning  Goal: Discharge to home or other facility with appropriate resources  Outcome: Progressing

## 2024-10-19 NOTE — NURSING NOTE
"NICOLEP NOTE     Problem:  Pt. Is continuing their journey on working to improve their anxiety & depressive Sx.     Behavior:  Pt. Is cooperative w/ Tx. Plan and has been agreeable to talking w/ peers. Pt. Has been able to maintain safety. Pt. Is a bit reclusive to self & room. Pt. Expressed interest in a facility called \"[facility]\" for aftercare.  Appetite/Meals: good        Interventions:  Pt. Was offered groups and their scheduled medications.     Response:  Pt. Has been compliant w/ meds, tolerated them well; and did attend the majority of group sessions w/ good results.     Plan:  Pt. will continue to be monitored for changes in mental status & safety on the unit.    "

## 2024-10-20 VITALS
HEIGHT: 63 IN | DIASTOLIC BLOOD PRESSURE: 76 MMHG | BODY MASS INDEX: 44.73 KG/M2 | WEIGHT: 252.43 LBS | SYSTOLIC BLOOD PRESSURE: 145 MMHG | RESPIRATION RATE: 18 BRPM | OXYGEN SATURATION: 97 % | HEART RATE: 87 BPM | TEMPERATURE: 99 F

## 2024-10-20 PROCEDURE — 2500000001 HC RX 250 WO HCPCS SELF ADMINISTERED DRUGS (ALT 637 FOR MEDICARE OP): Performed by: PSYCHIATRY & NEUROLOGY

## 2024-10-20 PROCEDURE — 99232 SBSQ HOSP IP/OBS MODERATE 35: CPT | Performed by: PSYCHIATRY & NEUROLOGY

## 2024-10-20 PROCEDURE — 2500000001 HC RX 250 WO HCPCS SELF ADMINISTERED DRUGS (ALT 637 FOR MEDICARE OP): Performed by: INTERNAL MEDICINE

## 2024-10-20 PROCEDURE — 1140000001 HC PRIVATE PSYCH ROOM DAILY

## 2024-10-20 PROCEDURE — 2500000002 HC RX 250 W HCPCS SELF ADMINISTERED DRUGS (ALT 637 FOR MEDICARE OP, ALT 636 FOR OP/ED): Performed by: PSYCHIATRY & NEUROLOGY

## 2024-10-20 ASSESSMENT — ACTIVITIES OF DAILY LIVING (ADL): EFFECT OF PAIN ON DAILY ACTIVITIES: MINIMAL

## 2024-10-20 ASSESSMENT — COLUMBIA-SUICIDE SEVERITY RATING SCALE - C-SSRS
6. HAVE YOU EVER DONE ANYTHING, STARTED TO DO ANYTHING, OR PREPARED TO DO ANYTHING TO END YOUR LIFE?: NO
1. SINCE LAST CONTACT, HAVE YOU WISHED YOU WERE DEAD OR WISHED YOU COULD GO TO SLEEP AND NOT WAKE UP?: NO
2. HAVE YOU ACTUALLY HAD ANY THOUGHTS OF KILLING YOURSELF?: NO

## 2024-10-20 ASSESSMENT — PAIN - FUNCTIONAL ASSESSMENT: PAIN_FUNCTIONAL_ASSESSMENT: 0-10

## 2024-10-20 ASSESSMENT — PAIN SCALES - GENERAL: PAINLEVEL_OUTOF10: 0 - NO PAIN

## 2024-10-20 ASSESSMENT — PAIN DESCRIPTION - DESCRIPTORS: DESCRIPTORS: PATIENT UNABLE TO DESCRIBE

## 2024-10-20 NOTE — GROUP NOTE
Group Topic: Other   Group Date: 10/20/2024  Start Time: 1345  End Time: 1430  Facilitators: SHER Evangelista   Department: [facility] REHABTH VIRTUAL    Number of Participants: 8   Group Focus: other [employer] Game  Treatment Modality: Other: Recreation Therapy  Interventions utilized were group exercise and mental fitness  Purpose: other: fun, increase socialization, stimulate memory, enhance self esteem    Name: April Gabino YOB: 1970   MR: [id number]      Facilitator: Recreational Therapist  Level of Participation: moderate  Quality of Participation: appropriate/pleasant, attentive, and cooperative  Interactions with others: appropriate and gave feedback  Mood/Affect: appropriate and brightens with interaction  Triggers (if applicable): n/a  Cognition: coherent/clear  Progress: Significant  Comments: pt problem is delusional thought.  Pt joins group and participates appropriately for about 20 minutes of group game.  Leaves to return to her room at that time.    Plan: continue with services

## 2024-10-20 NOTE — GROUP NOTE
Group Topic: Goals   Group Date: 10/20/2024  Start Time: 0730  End Time: 0800  Facilitators: Paul Byers   Department: [facility] Geriatric     Number of Participants: 6   Group Focus: goals  Treatment Modality: Patient-Centered Therapy  Interventions utilized were assignment  Purpose: self-care    Name: April Tate YOB: 1970   MR: [id number]      Facilitator: Mental [facility]  Level of Participation: did not attend  Quality of Participation: did not attend  Interactions with others: did not attend  Mood/Affect: did not attend  Triggers (if applicable): did not attend  Cognition: did not attend  Progress: None  Comments: did not attend  Plan: continue with services
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
## 2024-10-20 NOTE — GROUP NOTE
Group Topic: Self-Care/Wellness   Group Date: 10/20/2024  Start Time: 1000  End Time: 1115  Facilitators: SHER Evangelista   Department: [facility] REHABTH VIRTUAL    Number of Participants: 7   Group Focus: other Positive Steps to Well Being  Treatment Modality: Other: Recreation Therapy  Interventions utilized were exploration, group exercise, patient education, problem solving, and support  Purpose: coping skills, maladaptive thinking, feelings, irrational fears, communication skills, insight or knowledge, self-worth, self-care, relapse prevention strategies, and trigger / craving management    Name: April Gabino YOB: 1970   MR: [id number]      Facilitator: Recreational Therapist  Level of Participation: minimal  Quality of Participation: passive and quiet  Interactions with others:  passive  Mood/Affect:  passive  Triggers (if applicable): n/a  Cognition:  passive  Progress: Minimal  Comments: pt problem is delusional thought.  Pt joins group with little encouragement but appears to not be able to focus on group topic.  Possibly hallucinating AEB poor concentration and limited eye contact.  Pt leaves group after the first few minutes to go back to her room.    Plan: continue with services
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
## 2024-10-21 PROCEDURE — 2500000001 HC RX 250 WO HCPCS SELF ADMINISTERED DRUGS (ALT 637 FOR MEDICARE OP): Performed by: INTERNAL MEDICINE

## 2024-10-21 PROCEDURE — 99232 SBSQ HOSP IP/OBS MODERATE 35: CPT | Performed by: PSYCHIATRY & NEUROLOGY

## 2024-10-21 PROCEDURE — 2500000001 HC RX 250 WO HCPCS SELF ADMINISTERED DRUGS (ALT 637 FOR MEDICARE OP): Performed by: PSYCHIATRY & NEUROLOGY

## 2024-10-21 PROCEDURE — 2500000002 HC RX 250 W HCPCS SELF ADMINISTERED DRUGS (ALT 637 FOR MEDICARE OP, ALT 636 FOR OP/ED): Performed by: PSYCHIATRY & NEUROLOGY

## 2024-10-21 PROCEDURE — 1140000001 HC PRIVATE PSYCH ROOM DAILY

## 2024-10-21 PROCEDURE — 99232 SBSQ HOSP IP/OBS MODERATE 35: CPT | Performed by: INTERNAL MEDICINE

## 2024-10-21 ASSESSMENT — PAIN - FUNCTIONAL ASSESSMENT
PAIN_FUNCTIONAL_ASSESSMENT: 0-10

## 2024-10-21 ASSESSMENT — COLUMBIA-SUICIDE SEVERITY RATING SCALE - C-SSRS
6. HAVE YOU EVER DONE ANYTHING, STARTED TO DO ANYTHING, OR PREPARED TO DO ANYTHING TO END YOUR LIFE?: NO
1. SINCE LAST CONTACT, HAVE YOU WISHED YOU WERE DEAD OR WISHED YOU COULD GO TO SLEEP AND NOT WAKE UP?: NO
1. SINCE LAST CONTACT, HAVE YOU WISHED YOU WERE DEAD OR WISHED YOU COULD GO TO SLEEP AND NOT WAKE UP?: NO
2. HAVE YOU ACTUALLY HAD ANY THOUGHTS OF KILLING YOURSELF?: NO
6. HAVE YOU EVER DONE ANYTHING, STARTED TO DO ANYTHING, OR PREPARED TO DO ANYTHING TO END YOUR LIFE?: NO
2. HAVE YOU ACTUALLY HAD ANY THOUGHTS OF KILLING YOURSELF?: NO
2. HAVE YOU ACTUALLY HAD ANY THOUGHTS OF KILLING YOURSELF?: NO
6. HAVE YOU EVER DONE ANYTHING, STARTED TO DO ANYTHING, OR PREPARED TO DO ANYTHING TO END YOUR LIFE?: NO
1. SINCE LAST CONTACT, HAVE YOU WISHED YOU WERE DEAD OR WISHED YOU COULD GO TO SLEEP AND NOT WAKE UP?: NO

## 2024-10-21 ASSESSMENT — PAIN SCALES - GENERAL
PAINLEVEL_OUTOF10: 0 - NO PAIN
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
## 2024-10-21 NOTE — PROGRESS NOTES
[facility]: [facility]  PROGRESS NOTE      Guadalupe Lloyd is a 54 y.o. female that is being seen  today for follow-up at [facility]..  Subjective   Patient is being seen for follow-up at [facility].  Vital signs are stable.  Patient denies any significant complaints.  Reflux symptoms have been stable.  Patient has been eating well and participating in activities.      ROS  Negative for fever or chills  Negative for sore throat, ear pain, nasal discharge  Negative for cough, shortness of breath or wheezing  Negative for chest pain, palpitations, swelling of legs  Negative for abdominal pain, constipation, diarrhea, blood in the stools  Negative for urinary complaints  Negative for headache, dizziness, weakness or numbness  Negative for joint pain  Positive for depression and anxiety  All other systems reviewed and were negative   Vitals:    10/21/24 0507   BP: 117/66   Pulse: 62   Resp: 18   Temp: 36.9 °C (98.4 °F)   SpO2: 96%      Vitals:    10/18/24 1010   Weight: 115 kg (252 lb 6.8 oz)     Body mass index is 44.72 kg/m².  Physical Exam  Constitutional: Patient does not appear to be in any acute distress  Head and Face: NCAT  Eyes: Normal external exam, EOMI  ENT: Normal external inspection of ears and nose. Oropharynx normal.  Cardiovascular: RRR, S1/S2, no murmurs, rubs, or gallops, radial pulses +2, no edema of extremities  Pulmonary: CTAB, no respiratory distress.  Abdomen: +BS, soft, non-tender, nondistended, no guarding or rebound, no masses noted  MSK: No joint swelling, normal movements of all extremities. Range of motion- normal.  Skin- No lesions, contusions, or erythema.  Peripheral puslses palpable bilaterally 2+  Neuro: AAO X3, Cranial nerves 2-12 grossly intact,DTR 2+ in all 4 limbs       LABS   [unfilled]  Lab Results   Component Value Date    GLUCOSE 128 (H) 10/14/2024    CALCIUM 9.6 10/14/2024     10/14/2024    K 4.3 10/14/2024    CO2 22 10/14/2024     10/14/2024     BUN 16 10/14/2024    CREATININE 0.96 10/14/2024     Lab Results   Component Value Date    ALT 20 10/14/2024    AST 20 10/14/2024    ALKPHOS 72 10/14/2024    BILITOT 0.3 10/14/2024     Lab Results   Component Value Date    WBC 7.2 10/14/2024    HGB 11.7 (L) 10/14/2024    HCT 37.3 10/14/2024    MCV 96 10/14/2024     10/14/2024     Lab Results   Component Value Date    CHOL 138 10/14/2024    CHOL 157 11/04/2020    CHOL 164 04/18/2019     Lab Results   Component Value Date    HDL 36.1 10/14/2024    HDL 43.9 11/04/2020    HDL 50.4 04/18/2019     Lab Results   Component Value Date    LDLCALC 74 10/14/2024     Lab Results   Component Value Date    TRIG 140 10/14/2024    TRIG 97 11/04/2020     Lab Results   Component Value Date    HGBA1C 5.7 (H) 09/16/2024     Other labs not included in the list above were reviewed either before or during this encounter.    History    History reviewed. No pertinent past medical history.  No past surgical history on file.  No family history on file.  Allergies   Allergen Reactions    Penicillin G Anaphylaxis    [place] Unknown    Clozapine Unknown    [place] Unknown    Peanut Unknown    Sunflower Seed Unknown     Reported by patient with limited additional information beyond the presence of the allergy     No current facility-administered medications on file prior to encounter.     Current Outpatient Medications on File Prior to Encounter   Medication Sig Dispense Refill    atorvastatin (Lipitor) 40 mg tablet Take 1 tablet (40 mg) by mouth once daily at bedtime.      haloperidol (Haldol) 20 mg tablet Take 1 tablet (20 mg) by mouth 2 times a day.      haloperidol decanoate (Haldol Decanoate) 100 mg/mL injection Inject 1.5 mL (150 mg) into the muscle every 14 (fourteen) days.      lithium 300 mg capsule Take 1 capsule (300 mg) by mouth 3 times daily (morning, midday, late afternoon).      multivitamin with minerals tablet Take 1 tablet by mouth once daily.      OLANZapine (ZyPREXA) 20 mg  tablet Take 1 tablet (20 mg) by mouth once daily at bedtime.      pantoprazole (ProtoNix) 40 mg EC tablet Take 1 tablet (40 mg) by mouth once daily in the morning. Take before meals. Do not crush, chew, or split.      magnesium hydroxide (Milk of Magnesia) 400 mg/5 mL suspension Take 30 mL by mouth once daily as needed for constipation.      sodium phosphates (Fleet Enema) 19-7 gram/118 mL enema enema Insert 133 mL (1 enema) into the rectum if needed for constipation.      traZODone (Desyrel) 100 mg tablet Take 1 tablet (100 mg) by mouth once daily at bedtime.      [DISCONTINUED] DULCOLAX, BISACODYL, RECT Insert 1 suppository into the rectum once daily as needed.       There is no immunization history for the selected administration types on file for this patient.  Patient's medical history was reviewed and updated either before or during this encounter.  ASSESSMENT / PLAN:  Active Hospital Problems    Schizoaffective disorder, bipolar type (Multi)      Mixed hyperlipidemia      Gastroesophageal reflux disease without esophagitis      *Schizoaffective disorder (Multi)       Patient is being seen for follow-up visit.  Vital signs are stable.  Denies any complaints.      Rashid Arora MD

## 2024-10-21 NOTE — GROUP NOTE
Group Topic: Team Building   Group Date: 10/21/2024  Start Time: 1330  End Time: 1430  Facilitators: SHER Frias   Department: [facility]    Number of Participants: 10   Group Focus: communication, impulsivity, leisure skills, self-esteem, and social skills  Treatment Modality: Leisure Development and Other: Recreation Therapy  Interventions utilized were group exercise and leisure development  Purpose: Patients engaged in group “Guess It” game. In this therapeutic group patients play a game which is developed to focus on listening skills, workings together as a team, following directions, promote self-esteem and increase appropriate social interactions.      Name: April Gabino YOB: 1970   MR: [id number]      Facilitator: Recreational Therapist  Level of Participation: did not attend  Progress: None  Comments: pt problem is delusional thought. No handout available.   Plan: continue with services
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
## 2024-10-21 NOTE — GROUP NOTE
"Group Topic: Other   Group Date: 10/21/2024  Start Time: 1100  End Time: 1145  Facilitators: SHER Frias   Department: [facility]    Number of Participants: 8   Group Focus: Self-Esteem Mad Libs -humor, self-esteem and social skills  Treatment Modality: Leisure Development and Other: Recreation Therapy  Interventions utilized were group exercise, leisure development, and story telling  Purpose: Patients engaged in group activity completing \"Self Esteem Mad Libs\". Pt engaged in group discussion covering multi aspects of self-esteem and then participated in creating a Mad Kala story utilizing appropriate humor to foster enhanced mood/laughter, increase socialization. This activity also aims to enhance focus and following directions by use of unique storytelling.?      Name: Guadalupe Lloyd YOB: 1970   MR: [id number]      Facilitator: Recreational Therapist  Level of Participation: did not attend  Progress: None  Comments: pt problem is delusional thought. Pt declines to attend group. No handout offered.   Plan: continue with services      "

## 2024-10-21 NOTE — GROUP NOTE
Group Topic: Goals   Group Date: 10/21/2024  Start Time: 0730  End Time: 0800  Facilitators: Paul Byers   Department: [facility]     Number of Participants: 6   Group Focus: affirmation and goals  Treatment Modality: Patient-Centered Therapy  Interventions utilized were assignment and group exercise  Purpose: self-worth and self-care    Name: April Lloyd YOB: 1970   MR: [id number]      Facilitator: [facility]  Level of Participation: did not attend  Quality of Participation: did not attend  Interactions with others: did not attend  Mood/Affect: did not attend  Triggers (if applicable): did not attend  Cognition: did not attend  Progress: None  Comments: did not attend  Plan: continue with services

## 2024-10-21 NOTE — NURSING NOTE
ANTON NOTE     Problem:  Delusions     Behavior:  Pt has been pleasant and cooperative with care.  She is taking scheduled medications.  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  Group Participation: yes  Appetite/Meals: good       Interventions:  Give scheduled medications, every 15 minute checks, encouraged to go to group, reality orientation.    Response:  Pt continues with delusions that she is God.  She has been cooperative with care and no agitation or aggression.     Plan:  Give scheduled medications, every 15 minute checks, encouraged to go to group, reality orientation.

## 2024-10-21 NOTE — NURSING NOTE
ANTON NOTE     Problem:  Delusions     Behavior:  Pt pleasant, calm and cooperative. She is out of her room, noted to be in the group room amongst her peers partaking in snack and watching television. She frequently came to this RN asking for pieces of paper and envelopes. She then would bring back sealed envelopes asking if they could be mailed out. Envelopes placed on the front of patient's chart. Pt expressing to this RN that she does not want her guardian anymore and is asking for assistance on how she can go about getting him removed. She also asked this RN for help getting her 's license back.     Group Participation: Does not attend  Appetite/Meals: Hs snack provided       Interventions:  1:1 time provided- HS medication administered per MAR    Response:  Medication compliant- no s/s of distress noted.      Plan:  Continue to monitor and assist. Maintain q15 minute rounds for safety.

## 2024-10-22 PROCEDURE — 99232 SBSQ HOSP IP/OBS MODERATE 35: CPT | Performed by: PSYCHIATRY & NEUROLOGY

## 2024-10-22 PROCEDURE — 2500000001 HC RX 250 WO HCPCS SELF ADMINISTERED DRUGS (ALT 637 FOR MEDICARE OP): Performed by: PSYCHIATRY & NEUROLOGY

## 2024-10-22 PROCEDURE — 2500000002 HC RX 250 W HCPCS SELF ADMINISTERED DRUGS (ALT 637 FOR MEDICARE OP, ALT 636 FOR OP/ED): Performed by: PSYCHIATRY & NEUROLOGY

## 2024-10-22 PROCEDURE — 2500000001 HC RX 250 WO HCPCS SELF ADMINISTERED DRUGS (ALT 637 FOR MEDICARE OP): Performed by: INTERNAL MEDICINE

## 2024-10-22 PROCEDURE — 1140000001 HC PRIVATE PSYCH ROOM DAILY

## 2024-10-22 ASSESSMENT — PAIN - FUNCTIONAL ASSESSMENT
PAIN_FUNCTIONAL_ASSESSMENT: 0-10

## 2024-10-22 ASSESSMENT — COLUMBIA-SUICIDE SEVERITY RATING SCALE - C-SSRS
2. HAVE YOU ACTUALLY HAD ANY THOUGHTS OF KILLING YOURSELF?: NO
1. SINCE LAST CONTACT, HAVE YOU WISHED YOU WERE DEAD OR WISHED YOU COULD GO TO SLEEP AND NOT WAKE UP?: NO
6. HAVE YOU EVER DONE ANYTHING, STARTED TO DO ANYTHING, OR PREPARED TO DO ANYTHING TO END YOUR LIFE?: NO

## 2024-10-22 ASSESSMENT — PAIN SCALES - GENERAL
PAINLEVEL_OUTOF10: 0 - NO PAIN
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
## 2024-10-22 NOTE — NURSING NOTE
"ANTON NOTE     Problem:  Delusions     Behavior:  Pt walking in hallway at start of shift. Pleasantly smiling and greeting people as she walks by. Compliant with meds, cooperative with care. Sitting quietly in the group room among peers watching tv this evening. Pt approaches this RN and asks \"Can you revoke the 46 guardianships I have at the nursing home?\" Delusions present, pt expresses belief that she is pregnant. At approximately 2315, pt uses call light to ask \"Can I be on diet pills while I'm pregnant?\" Pt also requests \"prenatals.\"  Group Participation: n/a  Appetite/Meals: HS snack provided     Interventions:  1:1 provided with redirection. Evening meds given per orders.     Response:  Pt calm, resting in bed at this time. Respirations even, unlabored.     Plan:  Will continue to monitor behaviors and effectiveness of interventions while maintaining pt safety.    "

## 2024-10-22 NOTE — PROGRESS NOTES
"STONE spoke with pt's guardian, Gabe Linda. Gabe is pt's brother-in-law from a previous marriage. Gabe would like to find a medication to make pt \"non-delusional\". CRISTINAW discussed how the delusions might not be eliminated. Gabe reports that pt has been sent to the hospital 10 times within the past 8-10 months, as pt becomes non-compliant with medications at the facility. Gabe's plan is for pt to return to [facility]. He is aware that pt does not like it there but he feels that pt \"will not be happy anywhere\". He is aware that pt wants to come home but that is not an option if pt can not be compliant with her medications. STONE and Gabe discussed scheduled hearing for today at 1:30pm to review the need for continued guardianship as pt is contesting it. CRISTINAW instructed Gabe that pt is compliant with medications currently as well as pleasant and cooperative. LSW explained that discharge is likely to take place within the coming days. CRISTINAW will further coordinate with [facility] and follow up with Gabe.     NAIMA Bejarano   "

## 2024-10-22 NOTE — GROUP NOTE
Group Topic: Other   Group Date: 10/22/2024  Start Time: 1330  End Time: 1430  Facilitators: SHER Frias   Department: [facility] REHAB[facility] VIRTUAL    Number of Participants: 7   Group Focus: Enrique- concentration, leisure skills, self-esteem, and social skills  Treatment Modality: Leisure Development and Other: Recreation Therapy  Interventions utilized were group exercise, leisure development, and mental fitness  Purpose: In this group patients were prompted to engage together in a therapeutic card game of “Enrique” which aims to promote increased socialization, motivation, activity level, self-esteem and following directions along utilizing fine motor skills.       Name: April Gabino YOB: 1970   MR: [id number]      Facilitator: Recreational Therapist  Level of Participation: did not attend  Progress: None  Comments: pt problem is delusional thought. Pt declines to attend. No handout available.   Plan: continue with services

## 2024-10-22 NOTE — PROGRESS NOTES
LSW was approached by pt who has changed her mind and now does not want an  for guardianship hearing. LSW contacted the court on her behalf as requested. LSW was informed that pt would need to put it in writing and mailed to the court.     LSW was then re approached by pt and LSW presented information to her. Pt stated that she did not say that she didn't want an . Pt requests to go to [facility]. LSW explained to pt that the hospital has to abide by what the guardian says. Pt wants to get the guardianship terminated as she does not feel that she needs a guardian. LSW encouraged pt to take her medications to prove to the court that she is compliant. Pt states that she does take all of her medications. LSW informed pt this is not what has been reported by the facility. Pt then got up and walked away.     CRISTINAW was able to reach the DON who reported that pt is welcome to return and as early as tomorrow, anytime after 10am.     Tiarra Chopra, NAIMA RITTER

## 2024-10-22 NOTE — CARE PLAN
The patient's goals for the shift include maintain safety    The clinical goals for the shift include maintain safety    Over the shift, the patient did not make progress toward the following goals. Barriers to progression include mood disorder. Recommendations to address these barriers include pt education.      Problem: Sensory Perceptual Alteration as Evidenced by  Goal: Discusses signs/symptoms of illness/treatment options  Outcome: Progressing

## 2024-10-22 NOTE — GROUP NOTE
Group Topic: Medication Education   Group Date: 10/22/2024  Start Time: 1000  End Time: 1045  Facilitators: Danyelle Matthews PharmD   Department: [facility]    Number of Participants: 6   Group Focus: daily focus, goals, and other general medication education  Treatment Modality: Skills Training  Interventions utilized were group exercise, other bingo game, and patient education  Purpose: insight or knowledge    Name: Guadalupe Lloyd YOB: 1970   MR: [id number]      Facilitator: Pharmacist  Level of Participation: moderate  Quality of Participation: disruptive, distracting to others, impulsive, and intrusive  Interactions with others: intrusive  Mood/Affect: agitated and irritable  Triggers (if applicable): n/a  Cognition: not focused and pressured speech  Progress: Gaining insight or knowledge  Comments: Guadalupe Lloyd attended the general medication education group today. We played [employer].  Guadalupe Lloyd participated in the game by covering the topics discussed on the Bingo board. Guadalupe Lloyd was distracting to the group, bringing up unrelated topics and pleading to be weaned off her medications and go holistic.  Plan: continue with services

## 2024-10-22 NOTE — GROUP NOTE
Group Topic: Art Creative   Group Date: 10/22/2024  Start Time: 1100  End Time: 1200  Facilitators: SHER Frias   Department: [facility] REHABTH VIRTUAL    Number of Participants: 6   Group Focus: Creative arts- concentration, leisure skills, mindfulness, relaxation, self-esteem, and social skills  Treatment Modality: Art Therapy and Other: Recreation Therapy  Interventions utilized were leisure development and other creative arts  Purpose: Patients participated in therapeutic creative arts activity.. This activity promoted creative expression, leisure awareness and social interaction, while also working on fine motor skills and following directions. CTRS also played appropriate music selections of patients choices.      Name: April Gabino YOB: 1970   MR: [id number]      Facilitator: Recreational Therapist  Level of Participation: moderate  Quality of Participation: appropriate/pleasant, cooperative, and engaged  Interactions with others: appropriate  Mood/Affect: appropriate and brightens with interaction  Cognition: coherent/clear  Progress: Minimal  Comments: pt problem is delusional thought. Pt joins group and able to engage/participate appropriately for about 15 minutes. Pt then leaves group and does not return.   Plan: continue with services

## 2024-10-23 VITALS
SYSTOLIC BLOOD PRESSURE: 123 MMHG | HEART RATE: 60 BPM | HEIGHT: 63 IN | OXYGEN SATURATION: 95 % | TEMPERATURE: 98.4 F | RESPIRATION RATE: 17 BRPM | WEIGHT: 254.41 LBS | BODY MASS INDEX: 45.08 KG/M2 | DIASTOLIC BLOOD PRESSURE: 73 MMHG

## 2024-10-23 PROCEDURE — 99238 HOSP IP/OBS DSCHRG MGMT 30/<: CPT | Performed by: PSYCHIATRY & NEUROLOGY

## 2024-10-23 PROCEDURE — 2500000001 HC RX 250 WO HCPCS SELF ADMINISTERED DRUGS (ALT 637 FOR MEDICARE OP): Performed by: PSYCHIATRY & NEUROLOGY

## 2024-10-23 PROCEDURE — 99232 SBSQ HOSP IP/OBS MODERATE 35: CPT | Performed by: INTERNAL MEDICINE

## 2024-10-23 PROCEDURE — 2500000001 HC RX 250 WO HCPCS SELF ADMINISTERED DRUGS (ALT 637 FOR MEDICARE OP): Performed by: INTERNAL MEDICINE

## 2024-10-23 RX ORDER — HALOPERIDOL DECANOATE 100 MG/ML
150 INJECTION INTRAMUSCULAR
Start: 2024-10-30

## 2024-10-23 RX ORDER — BENZTROPINE MESYLATE 0.5 MG/1
0.5 TABLET ORAL 2 TIMES DAILY
Start: 2024-10-23

## 2024-10-23 RX ORDER — LORAZEPAM 1 MG/1
2 TABLET ORAL ONCE
Status: COMPLETED | OUTPATIENT
Start: 2024-10-23 | End: 2024-10-23

## 2024-10-23 RX ORDER — HALOPERIDOL 10 MG/1
10 TABLET ORAL 2 TIMES DAILY
Start: 2024-10-23

## 2024-10-23 ASSESSMENT — PAIN - FUNCTIONAL ASSESSMENT: PAIN_FUNCTIONAL_ASSESSMENT: 0-10

## 2024-10-23 ASSESSMENT — PAIN SCALES - GENERAL: PAINLEVEL_OUTOF10: 0 - NO PAIN

## 2024-10-23 NOTE — GROUP NOTE
Group Topic: Dialectical Behavioral Therapy - Mindfulness   Group Date: 10/23/2024  Start Time: 1000  End Time: 1050  Facilitators: ZOIE Mantilla   Department: [facility] CARE TRANSITIONS VIRTUAL    Number of Participants: 5   Group Focus: self-awareness  Treatment Modality: Cognitive Behavioral Therapy  Interventions utilized were assignment  Purpose: maladaptive thinking    Name: April Gabino YOB: 1970   MR: [id number]      Facilitator:   Level of Participation: did not attend  Quality of Participation: n/a  Interactions with others: n/a  Mood/Affect: n/a  Triggers (if applicable): n/a  Cognition: n/a  Progress: None  Comments: Pt did not attend group despite encouragement. Pt will be encouraged to attend next group. NICK-S will offer pt handout on today's topic if interested.   Plan: continue with services

## 2024-10-23 NOTE — NURSING NOTE
"Discharge Nursing Note    Discharge date: 10/23/24  Discharge time: 1352    Discharged to:  nursing home    Transportation provided by:  [facility]    Responsible person notified of discharge/transfer?  Include name of person if answering \"YES\"  Yes -     Patient left with all belongings:  Yes    Patient left with discharge medication list:  Yes    Patient left with discharge instructions:  Yes    AVS/Continuing Care Plan discussed with patient and copy given to either patient or handed to medical transport for discharges to facilities:  Yes    Other concerns at discharge:  none    Presentation on discharge:  Verbalized understanding of discharge instructions   "

## 2024-10-23 NOTE — PROGRESS NOTES
[facility]: [facility]  PROGRESS NOTE      Guadalupe Lloyd is a 54 y.o. female that is being seen  today for follow-up at [facility]..  Subjective   Patient is being seen for follow-up at [facility].  Vital signs are stable.  Patient denies any significant complaints.  Reflux symptoms have been stable.  Patient has been eating well and participating in activities.      ROS  Negative for fever or chills  Negative for sore throat, ear pain, nasal discharge  Negative for cough, shortness of breath or wheezing  Negative for chest pain, palpitations, swelling of legs  Negative for abdominal pain, constipation, diarrhea, blood in the stools  Negative for urinary complaints  Negative for headache, dizziness, weakness or numbness  Negative for joint pain  Positive for depression and anxiety  All other systems reviewed and were negative   Vitals:    10/23/24 0630   BP: 123/73   Pulse: 60   Resp: 17   Temp: 36.9 °C (98.4 °F)   SpO2: 95%      Vitals:    10/23/24 0630   Weight: 115 kg (254 lb 6.6 oz)     Body mass index is 45.07 kg/m².  Physical Exam  Constitutional: Patient does not appear to be in any acute distress  Head and Face: NCAT  Eyes: Normal external exam, EOMI  ENT: Normal external inspection of ears and nose. Oropharynx normal.  Cardiovascular: RRR, S1/S2, no murmurs, rubs, or gallops, radial pulses +2, no edema of extremities  Pulmonary: CTAB, no respiratory distress.  Abdomen: +BS, soft, non-tender, nondistended, no guarding or rebound, no masses noted  MSK: No joint swelling, normal movements of all extremities. Range of motion- normal.  Skin- No lesions, contusions, or erythema.  Peripheral puslses palpable bilaterally 2+  Neuro: AAO X3, Cranial nerves 2-12 grossly intact,DTR 2+ in all 4 limbs       LABS   [unfilled]  Lab Results   Component Value Date    GLUCOSE 128 (H) 10/14/2024    CALCIUM 9.6 10/14/2024     10/14/2024    K 4.3 10/14/2024    CO2 22 10/14/2024     10/14/2024     BUN 16 10/14/2024    CREATININE 0.96 10/14/2024     Lab Results   Component Value Date    ALT 20 10/14/2024    AST 20 10/14/2024    ALKPHOS 72 10/14/2024    BILITOT 0.3 10/14/2024     Lab Results   Component Value Date    WBC 7.2 10/14/2024    HGB 11.7 (L) 10/14/2024    HCT 37.3 10/14/2024    MCV 96 10/14/2024     10/14/2024     Lab Results   Component Value Date    CHOL 138 10/14/2024    CHOL 157 11/04/2020    CHOL 164 04/18/2019     Lab Results   Component Value Date    HDL 36.1 10/14/2024    HDL 43.9 11/04/2020    HDL 50.4 04/18/2019     Lab Results   Component Value Date    LDLCALC 74 10/14/2024     Lab Results   Component Value Date    TRIG 140 10/14/2024    TRIG 97 11/04/2020     Lab Results   Component Value Date    HGBA1C 5.7 (H) 09/16/2024     Other labs not included in the list above were reviewed either before or during this encounter.    History    History reviewed. No pertinent past medical history.  No past surgical history on file.  No family history on file.  Allergies   Allergen Reactions    Penicillin G Anaphylaxis    [place] Unknown    Clozapine Unknown    [place] Unknown    Peanut Unknown    Sunflower Seed Unknown     Reported by patient with limited additional information beyond the presence of the allergy     No current facility-administered medications on file prior to encounter.     Current Outpatient Medications on File Prior to Encounter   Medication Sig Dispense Refill    atorvastatin (Lipitor) 40 mg tablet Take 1 tablet (40 mg) by mouth once daily at bedtime.      haloperidol (Haldol) 20 mg tablet Take 1 tablet (20 mg) by mouth 2 times a day.      haloperidol decanoate (Haldol Decanoate) 100 mg/mL injection Inject 1.5 mL (150 mg) into the muscle every 14 (fourteen) days.      lithium 300 mg capsule Take 1 capsule (300 mg) by mouth 3 times daily (morning, midday, late afternoon).      multivitamin with minerals tablet Take 1 tablet by mouth once daily.      OLANZapine (ZyPREXA) 20 mg  tablet Take 1 tablet (20 mg) by mouth once daily at bedtime.      pantoprazole (ProtoNix) 40 mg EC tablet Take 1 tablet (40 mg) by mouth once daily in the morning. Take before meals. Do not crush, chew, or split.      magnesium hydroxide (Milk of Magnesia) 400 mg/5 mL suspension Take 30 mL by mouth once daily as needed for constipation.      sodium phosphates (Fleet Enema) 19-7 gram/118 mL enema enema Insert 133 mL (1 enema) into the rectum if needed for constipation.      traZODone (Desyrel) 100 mg tablet Take 1 tablet (100 mg) by mouth once daily at bedtime.       There is no immunization history for the selected administration types on file for this patient.  Patient's medical history was reviewed and updated either before or during this encounter.  ASSESSMENT / PLAN:  Active Hospital Problems    Schizoaffective disorder, bipolar type (Multi)      Mixed hyperlipidemia      Gastroesophageal reflux disease without esophagitis      *Schizoaffective disorder (Multi)       Patient is being seen for follow-up visit.  Vital signs are stable.  Denies any complaints.      Rashid Arora MD
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
## 2024-10-23 NOTE — GROUP NOTE
Group Topic: Self-Care/Wellness   Group Date: 10/23/2024  Start Time: 1100  End Time: 1145  Facilitators: SHER Evangelista   Department: [facility] REHABTH VIRTUAL    Number of Participants: 9   Group Focus: other Healthy Living Group  Treatment Modality: Other: Recreation Therapy  Interventions utilized were exploration, group exercise, patient education, problem solving, and support  Purpose: coping skills, maladaptive thinking, feelings, irrational fears, communication skills, insight or knowledge, self-worth, self-care, relapse prevention strategies, and trigger / craving management    Name: April Lloyd YOB: 1970   MR: [id number]      Facilitator: Recreational Therapist  Level of Participation: did not attend  Quality of Participation:  did not attend  Interactions with others:  did not attend  Mood/Affect:  did not attend  Triggers (if applicable): n/a  Cognition:  did not attend  Progress: None  Comments: pt problem is delusional thought.  Pt refuses to attend group at this time.  No handout to offer.   Plan: continue with services
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
